# Patient Record
Sex: FEMALE | Race: WHITE | NOT HISPANIC OR LATINO | Employment: OTHER | URBAN - NONMETROPOLITAN AREA
[De-identification: names, ages, dates, MRNs, and addresses within clinical notes are randomized per-mention and may not be internally consistent; named-entity substitution may affect disease eponyms.]

---

## 2017-11-15 ENCOUNTER — APPOINTMENT (OUTPATIENT)
Dept: GENERAL RADIOLOGY | Facility: HOSPITAL | Age: 62
End: 2017-11-15
Attending: FAMILY MEDICINE
Payer: COMMERCIAL

## 2017-11-15 ENCOUNTER — HOSPITAL ENCOUNTER (EMERGENCY)
Facility: HOSPITAL | Age: 62
Discharge: HOME | End: 2017-11-15
Admitting: FAMILY MEDICINE
Payer: COMMERCIAL

## 2017-11-15 VITALS
RESPIRATION RATE: 20 BRPM | SYSTOLIC BLOOD PRESSURE: 113 MMHG | DIASTOLIC BLOOD PRESSURE: 59 MMHG | TEMPERATURE: 97.8 F | HEART RATE: 82 BPM | OXYGEN SATURATION: 93 %

## 2017-11-15 DIAGNOSIS — S33.5XXA LUMBAR SPRAIN, INITIAL ENCOUNTER: ICD-10-CM

## 2017-11-15 PROCEDURE — 99283 EMERGENCY DEPT VISIT LOW MDM: CPT | Performed by: FAMILY MEDICINE

## 2017-11-15 PROCEDURE — 72100 X-RAY EXAM L-S SPINE 2/3 VWS: CPT | Mod: TC

## 2017-11-15 PROCEDURE — 99283 EMERGENCY DEPT VISIT LOW MDM: CPT

## 2017-11-15 RX ORDER — NAPROXEN 500 MG/1
500 TABLET ORAL 2 TIMES DAILY WITH MEALS
Qty: 16 TABLET | Refills: 0 | Status: SHIPPED | OUTPATIENT
Start: 2017-11-15 | End: 2017-11-21

## 2017-11-15 RX ORDER — CYCLOBENZAPRINE HCL 5 MG
5 TABLET ORAL 3 TIMES DAILY PRN
Qty: 15 TABLET | Refills: 0 | Status: SHIPPED | OUTPATIENT
Start: 2017-11-15 | End: 2017-11-21

## 2017-11-15 ASSESSMENT — ENCOUNTER SYMPTOMS
CONSTITUTIONAL NEGATIVE: 1
NECK STIFFNESS: 0
RESPIRATORY NEGATIVE: 1
EYES NEGATIVE: 1
ENDOCRINE NEGATIVE: 1
JOINT SWELLING: 0
ARTHRALGIAS: 0
CARDIOVASCULAR NEGATIVE: 1
NEUROLOGICAL NEGATIVE: 1
BACK PAIN: 1
NECK PAIN: 0
MYALGIAS: 0
GASTROINTESTINAL NEGATIVE: 1

## 2017-11-15 NOTE — ED AVS SNAPSHOT
[facility]    [address] [address]    Phone:  [phone]                                       Corinne S Eden   MRN: [id number]    Department:  [facility]   Date of Visit:  11/15/2017           After Visit Summary Signature Page     I have received my discharge instructions, and my questions have been answered. I have discussed any challenges I see with this plan with the nurse or doctor.    ..........................................................................................................................................  Patient/Patient Representative Signature      ..........................................................................................................................................  Patient Representative Print Name and Relationship to Patient    ..................................................               ................................................  Date                                            Time    ..........................................................................................................................................  Reviewed by Signature/Title    ...................................................              ..............................................  Date                                                            Time

## 2017-11-15 NOTE — ED NOTES
Face to face report given with opportunity to observe patient.    Report given to brad Mcallister   11/15/2017  7:06 AM

## 2017-11-15 NOTE — ED AVS SNAPSHOT
[facility]    [address]    [place] [address]    Phone:  [phone]                                       Corinne S Eden   MRN: [id number]    Department:  [facility]   Date of Visit:  11/15/2017           Patient Information     Date Of Birth          1955        Your diagnoses for this visit were:     Lumbar sprain, initial encounter       Follow-up Information     Schedule an appointment as soon as possible for a visit with No Ref-Primary, Physician.    Why:  If symptoms worsen or not improving within the next week     Contact information:    NO REF-PRIMARY PHYSICIAN          Discharge Instructions         Causes of Lumbar (Low Back) Pain  Low back pain can be caused by problems with any part of the lumbar spine. A disk can herniate (push out) and press on a nerve. Vertebrae can rub against each other or slip out of place. This can irritate facet joints and nerves. It can also lead to stenosis, a narrowing of the spinal canal or foramen.  Pressure from a disk  Constant wear and tear on a disk can cause it to weaken and push outward. Part of the disk may then press on nearby nerves. There are two common types of herniated disks:  Contained means the soft nucleus is protruding outward.   Extruded means the firm annulus has torn, letting the soft center squeeze through.     Pressure from bone  An unstable spine   With age, a disk may thin and wear out. Vertebrae above and below the disk may begin to touch. This can put pressure on nerves. It can also cause bone spurs (growths) to form where the bones rub together.    Stenosis results when bone spurs narrow the foramen or spinal canal. This also puts pressure on nerves. Slipping vertebrae can irritate nerves and joints. They can also worsen stenosis.    In some cases, vertebrae become unstable and slip forward. This is called spondylolisthesis.     Date Last Reviewed: 10/12/2015    0148-2277 [employer]  [address] [place] [address]. All rights reserved. This information is not intended as a substitute for professional medical care. Always follow your healthcare professional's instructions.             Review of your medicines      START taking        Dose / Directions Last dose taken    cyclobenzaprine 5 MG tablet   Commonly known as:  FLEXERIL   Dose:  5 mg   Quantity:  15 tablet        Take 1 tablet (5 mg) by mouth 3 times daily as needed for muscle spasms   Refills:  0        naproxen 500 MG tablet   Commonly known as:  NAPROSYN   Dose:  500 mg   Quantity:  16 tablet        Take 1 tablet (500 mg) by mouth 2 times daily (with meals) for 8 days   Refills:  0          Our records show that you are taking the medicines listed below. If these are incorrect, please call your family doctor or clinic.        Dose / Directions Last dose taken    calcium-vitamin D 600-400 MG-UNIT per tablet   Commonly known as:  CALTRATE   Dose:  1 tablet        Take 1 tablet by mouth 2 times daily   Refills:  0        dexamethasone 4 MG tablet   Commonly known as:  DECADRON   Dose:  4 mg   Quantity:  10 tablet        Take 1 tablet (4 mg) by mouth 2 times daily (with meals) for 5 days   Refills:  0        vitamin B complex with vitamin C Tabs tablet   Dose:  1 tablet        Take 1 tablet by mouth daily   Refills:  0                Prescriptions were sent or printed at these locations (2 Prescriptions)                   [facility] [address] [place] [address] [facility] [place] [facility] [address] [facility] [address]   [address] [place] [address]    Telephone:  [phone]   Fax:  [phone]   Hours:                  E-Prescribed (2 of 2)         cyclobenzaprine (FLEXERIL) 5 MG tablet               naproxen (NAPROSYN) 500 MG tablet                Procedures and tests performed during your visit     Lumbar spine XR, 2-3 views      Orders Needing Specimen Collection     None      Pending Results     Date and Time Order Name Status  "Description    11/15/2017 0705 Lumbar spine XR, 2-3 views In process             Pending Culture Results     No orders found from 2017 to 2017.            Thank you for choosing [facility]       Thank you for choosing [facility] for your care. Our goal is always to provide you with excellent care. Hearing back from our patients is one way we can continue to improve our services. Please take a few minutes to complete the written survey that you may receive in the mail after you visit with us. Thank you!        [employer] Information     [employer] lets you send messages to your doctor, view your test results, renew your prescriptions, schedule appointments and more. To sign up, go to [email or web address] . Click on \"Log in\" on the left side of the screen, which will take you to the Welcome page. Then click on \"Sign up Now\" on the right side of the page.     You will be asked to enter the access code listed below, as well as some personal information. Please follow the directions to create your username and password.     Your access code is: [email or web address]  Expires: 2018  7:39 AM     Your access code will  in 90 days. If you need help or a new code, please call your [facility] clinic or [phone].        Care EveryWhere ID     This is your Care EveryWhere ID. This could be used by other organizations to access your [facility] medical records  [id number]        Equal Access to Services     ABEL RUSSELL : Hadjennifer Odell, waaxda jessica, qaybta beaualmatiana hernandez . So [facility] [place] [facility] [phone].    ATENCIÓN: Si habla español, tiene a oquendo disposición servicios gratuitos de asistencia lingüística. Llame al [phone].    We comply with applicable federal civil rights laws and [place] laws. We do not discriminate on the basis of race, color, national origin, age, disability, sex, sexual orientation, or gender identity.            After Visit Summary     "   This is your record. Keep this with you and show to your community pharmacist(s) and doctor(s) at your next visit.

## 2017-11-15 NOTE — ED NOTES
Discharged to home with son.  Prescriptions were e-scribed to [facility].  Pt c/o pain with blood pressure cuff during BP reading.  Switched cuffs and arm, second reading was not painful.

## 2017-11-15 NOTE — DISCHARGE INSTRUCTIONS
Causes of Lumbar (Low Back) Pain  Low back pain can be caused by problems with any part of the lumbar spine. A disk can herniate (push out) and press on a nerve. Vertebrae can rub against each other or slip out of place. This can irritate facet joints and nerves. It can also lead to stenosis, a narrowing of the spinal canal or foramen.  Pressure from a disk  Constant wear and tear on a disk can cause it to weaken and push outward. Part of the disk may then press on nearby nerves. There are two common types of herniated disks:  Contained means the soft nucleus is protruding outward.   Extruded means the firm annulus has torn, letting the soft center squeeze through.     Pressure from bone  An unstable spine   With age, a disk may thin and wear out. Vertebrae above and below the disk may begin to touch. This can put pressure on nerves. It can also cause bone spurs (growths) to form where the bones rub together.    Stenosis results when bone spurs narrow the foramen or spinal canal. This also puts pressure on nerves. Slipping vertebrae can irritate nerves and joints. They can also worsen stenosis.    In some cases, vertebrae become unstable and slip forward. This is called spondylolisthesis.     Date Last Reviewed: 10/12/2015    5960-3651 [employer]. [address] [place] [address], [place] [address]. All rights reserved. This information is not intended as a substitute for professional medical care. Always follow your healthcare professional's instructions.

## 2017-11-15 NOTE — ED NOTES
"Pt ambulatory to room 1 of the ED with son. Pt reports that she started having left lower back pain about 2 days ago, no injury noted, 5/10 pain. CMS intact. Pt reports it hurts worse if she is sits for awhile, but gets better if shes up and moving around. Pt does not have a PCP because she doesn't \"need one.\" Call light in reach.  "

## 2017-11-15 NOTE — ED PROVIDER NOTES
History     Chief Complaint   Patient presents with     Back Pain     HPI  Corinne S Eden is a 62 year old female who presents to the ER with back pain started 2 days ago . Worsening . Localized to left lower spine . No radiation . Rates 5/10. Does not describe pain as burning. Sharp pain with movement otherwise more of an ache.  NO bowel or bladder dysfunction . NO numbness or weakness of lower extremities.  Remainder of ros negative .     Problem List:    There are no active problems to display for this patient.       Past Medical History:    History reviewed. No pertinent past medical history.    Past Surgical History:    History reviewed. No pertinent surgical history.    Family History:    No family history on file.    Social History:  Marital Status:  Single [1]  Social History   Substance Use Topics     Smoking status: Current Every Day Smoker     Packs/day: 1.00     Years: 30.00     Smokeless tobacco: Never Used     Alcohol use Yes      Comment: rarely        Medications:      calcium-vitamin D (CALTRATE) 600-400 MG-UNIT per tablet   vitamin  B complex with vitamin C (VITAMIN  B COMPLEX) TABS   dexamethasone (DECADRON) 4 MG tablet         Review of Systems   Constitutional: Negative.    HENT: Negative.    Eyes: Negative.    Respiratory: Negative.    Cardiovascular: Negative.    Gastrointestinal: Negative.    Endocrine: Negative.    Genitourinary: Negative.    Musculoskeletal: Positive for back pain. Negative for arthralgias, gait problem, joint swelling, myalgias, neck pain and neck stiffness.   Neurological: Negative.    All other systems reviewed and are negative.      Physical Exam   BP: 122/73  Pulse: 100  Temp: 97.1  F (36.2  C)  Resp: 20  SpO2: 94 %      Physical Exam   Constitutional: She is oriented to person, place, and time. She appears well-developed and well-nourished. No distress.   HENT:   Head: Normocephalic and atraumatic.   Right Ear: External ear normal.   Left Ear: External ear normal.    Nose: Nose normal.   Mouth/Throat: Oropharynx is clear and moist. No oropharyngeal exudate.   Eyes: Pupils are equal, round, and reactive to light. Right eye exhibits no discharge. Left eye exhibits no discharge.   Neck: Normal range of motion.   Cardiovascular: Normal rate and regular rhythm.    Pulmonary/Chest: Effort normal and breath sounds normal.   Abdominal: Soft.   Musculoskeletal:   Tenderness lower LS spine to palpation . Left paralumbar spine spasm and tenderness    Neurological: She is alert and oriented to person, place, and time. She displays normal reflexes. No cranial nerve deficit. She exhibits normal muscle tone. Coordination normal.   Skin: Skin is warm and dry. She is not diaphoretic.   Psychiatric: She has a normal mood and affect.   Nursing note and vitals reviewed.      ED Course     ED Course     Procedures        Patient arrived to ED ambulatory . Patient triaged to exam room . History and exam completed. Xray completed . Degenerative changes lower lumbar spine .  NO acute findings. Discussed findings with patient . Patient discharged with instructions for muscle relaxers, ice, NSAIDs, avoidance of heavy lifting        Labs Ordered and Resulted from Time of ED Arrival Up to the Time of Departure from the ED - No data to display    Assessments & Plan (with Medical Decision Making)     I have reviewed the nursing notes.    I have reviewed the findings, diagnosis, plan and need for follow up with the patient.  Acute lumbar strain , NSAIDS, ice, flexeril as needed . Follow up if symptoms do not improve as anticipated     New Prescriptions    No medications on file       Acute lumbar strain,     11/15/2017   [facility]     Angie Schroeder MD  11/15/17 1806

## 2017-11-16 NOTE — PROGRESS NOTES
Lumbar Spine XR - IMPRESSION: Mild compression deformity of L4, age is uncertain.  Degenerative changes as described above.  Reviewed by SELVIN Wade, advised to follow up in one week if not improving and make appt to establish care with PCP.  TC to patient, notified of XR results and recommended follow up.  No further questions or concerns at this time

## 2017-11-21 ENCOUNTER — OFFICE VISIT (OUTPATIENT)
Dept: FAMILY MEDICINE | Facility: OTHER | Age: 62
End: 2017-11-21
Attending: FAMILY MEDICINE
Payer: COMMERCIAL

## 2017-11-21 VITALS
TEMPERATURE: 97.5 F | WEIGHT: 210 LBS | HEIGHT: 65 IN | BODY MASS INDEX: 34.99 KG/M2 | HEART RATE: 100 BPM | OXYGEN SATURATION: 95 % | SYSTOLIC BLOOD PRESSURE: 128 MMHG | DIASTOLIC BLOOD PRESSURE: 70 MMHG | RESPIRATION RATE: 24 BRPM

## 2017-11-21 DIAGNOSIS — S39.012A STRAIN OF LUMBAR REGION, INITIAL ENCOUNTER: ICD-10-CM

## 2017-11-21 DIAGNOSIS — M80.88XD OSTEOPOROTIC COMPRESSION FRACTURE OF SPINE, WITH ROUTINE HEALING, SUBSEQUENT ENCOUNTER: Primary | ICD-10-CM

## 2017-11-21 PROCEDURE — 99213 OFFICE O/P EST LOW 20 MIN: CPT | Performed by: FAMILY MEDICINE

## 2017-11-21 RX ORDER — CYCLOBENZAPRINE HCL 5 MG
5 TABLET ORAL 3 TIMES DAILY PRN
Qty: 40 TABLET | Refills: 0 | Status: SHIPPED | OUTPATIENT
Start: 2017-11-21 | End: 2018-05-15

## 2017-11-21 RX ORDER — NAPROXEN 500 MG/1
500 TABLET ORAL 2 TIMES DAILY WITH MEALS
Qty: 60 TABLET | Refills: 1 | Status: SHIPPED | OUTPATIENT
Start: 2017-11-21 | End: 2020-07-24

## 2017-11-21 ASSESSMENT — ANXIETY QUESTIONNAIRES
IF YOU CHECKED OFF ANY PROBLEMS ON THIS QUESTIONNAIRE, HOW DIFFICULT HAVE THESE PROBLEMS MADE IT FOR YOU TO DO YOUR WORK, TAKE CARE OF THINGS AT HOME, OR GET ALONG WITH OTHER PEOPLE: SOMEWHAT DIFFICULT
5. BEING SO RESTLESS THAT IT IS HARD TO SIT STILL: NOT AT ALL
6. BECOMING EASILY ANNOYED OR IRRITABLE: NOT AT ALL
7. FEELING AFRAID AS IF SOMETHING AWFUL MIGHT HAPPEN: NOT AT ALL
1. FEELING NERVOUS, ANXIOUS, OR ON EDGE: NOT AT ALL
2. NOT BEING ABLE TO STOP OR CONTROL WORRYING: NOT AT ALL
3. WORRYING TOO MUCH ABOUT DIFFERENT THINGS: NOT AT ALL
4. TROUBLE RELAXING: SEVERAL DAYS
GAD7 TOTAL SCORE: 1

## 2017-11-21 ASSESSMENT — PAIN SCALES - GENERAL: PAINLEVEL: MILD PAIN (2)

## 2017-11-21 ASSESSMENT — PATIENT HEALTH QUESTIONNAIRE - PHQ9: SUM OF ALL RESPONSES TO PHQ QUESTIONS 1-9: 2

## 2017-11-21 NOTE — NURSING NOTE
"Chief Complaint   Patient presents with     Back Pain     Follow up ER for left lower back pain - denies injury - onset 2 weeks       Initial /70  Pulse 100  Temp 97.5  F (36.4  C) (Tympanic)  Resp 24  Ht 5' 5\" (1.651 m)  Wt 210 lb (95.3 kg)  SpO2 95%  BMI 34.95 kg/m2 Estimated body mass index is 34.95 kg/(m^2) as calculated from the following:    Height as of this encounter: 5' 5\" (1.651 m).    Weight as of this encounter: 210 lb (95.3 kg).  Medication Reconciliation: complete   Aaliyah Guillermo LPN      "

## 2017-11-21 NOTE — MR AVS SNAPSHOT
"              After Visit Summary   11/21/2017    Corinne S Eden    MRN: [id number]           Patient Information     Date Of Birth          1955        Visit Information        Provider Department      11/21/2017 8:45 AM Yanna Epperson MD [facility]        Today's Diagnoses     Osteoporotic compression fracture of spine, with routine healing, subsequent encounter    -  1    Strain of lumbar region, initial encounter           Follow-ups after your visit        Additional Services     PHYSICAL THERAPY REFERRAL       *This therapy referral will be filtered to a centralized scheduling office at [facility] and the patient will receive a call to schedule an appointment at a [facility] location most convenient for them. *     [facility] provides Physical Therapy evaluation and treatment and many specialty services across the [facility] system.  If requesting a specialty program, please choose from the list below.    If you have not heard from the scheduling office within 2 business days, please call [phone] for all locations, with the exception of [place], please call [phone].  Treatment: Evaluation & Treatment  Special Instructions/Modalities: none   Special Programs:     Please be aware that coverage of these services is subject to the terms and limitations of your health insurance plan.  Call member services at your health plan with any benefit or coverage questions.      **Note to Provider:  If you are referring outside of [facility] for the therapy appointment, please list the name of the location in the \"special instructions\" above, print the referral and give to the patient to schedule the appointment.                  Your next 10 appointments already scheduled     Nov 28, 2017  8:30 AM CST   LBP LOW INITIAL with Carlie Dunaway, PT   [facility] ([facility] )    [address] [place] [address]   [phone]            " "Dec 19, 2017  9:00 AM CST   (Arrive by 8:45 AM)   Office Visit with Yanna Epperson MD   [facility] ([facility] )    [place] [address]   [phone]           Bring a current list of meds and any records pertaining to this visit.  For Physicals, please bring immunization records and any forms needing to be filled out.  Please arrive 15 minutes early to complete paperwork and register.              Who to contact     If you have questions or need follow up information about today's clinic visit or your schedule please contact [facility] directly at [phone].  Normal or non-critical lab and imaging results will be communicated to you by MyChart, letter or phone within 4 business days after the clinic has received the results. If you do not hear from us within 7 days, please contact the clinic through [employer] or phone. If you have a critical or abnormal lab result, we will notify you by phone as soon as possible.  Submit refill requests through [employer] or call your pharmacy and they will forward the refill request to us. Please allow 3 business days for your refill to be completed.          Additional Information About Your Visit        MyChart Information     [employer] lets you send messages to your doctor, view your test results, renew your prescriptions, schedule appointments and more. To sign up, go to [email or web address] . Click on \"Log in\" on the left side of the screen, which will take you to the Welcome page. Then click on \"Sign up Now\" on the right side of the page.     You will be asked to enter the access code listed below, as well as some personal information. Please follow the directions to create your username and password.     Your access code is: [email or web address]  Expires: 2018  7:39 AM     Your access code will  in 90 days. If you need help or a new code, please call your [facility] or [phone].        Care " "EveryWhere ID     This is your Care EveryWhere ID. This could be used by other organizations to access your [facility] medical records  [id number]        Your Vitals Were     Pulse Temperature Respirations Height Pulse Oximetry BMI (Body Mass Index)    100 97.5  F (36.4  C) (Tympanic) 24 5' 5\" (1.651 m) 95% 34.95 kg/m2       Blood Pressure from Last 3 Encounters:   11/21/17 128/70   11/15/17 113/59   04/09/15 139/86    Weight from Last 3 Encounters:   11/21/17 210 lb (95.3 kg)              We Performed the Following     PHYSICAL THERAPY REFERRAL          Where to get your medicines      These medications were sent to [id number] - [place], [place] - [address] AT [facility] [address], [place] [address]     Phone:  [phone]     cyclobenzaprine 5 MG tablet    naproxen 500 MG tablet          Primary Care Provider    Physician No Ref-Primary       NO REF-PRIMARY PHYSICIAN        Equal Access to Services     CHASE [facility] Center[facility]A AH: Hadii aad ku hadasho Soomaali, waaxda luqadaha, qaybta kaalmada adeegyada, waxay mayain hayjoselo dominguez . So [facility] [phone].    ATENCIÓN: Si habla español, tiene a oquendo disposición servicios gratuitos de asistencia lingüística. Llame al [phone].    We comply with applicable federal civil rights laws and [place] laws. We do not discriminate on the basis of race, color, national origin, age, disability, sex, sexual orientation, or gender identity.            Thank you!     Thank you for choosing [facility]  for your care. Our goal is always to provide you with excellent care. Hearing back from our patients is one way we can continue to improve our services. Please take a few minutes to complete the written survey that you may receive in the mail after your visit with us. Thank you!             Your Updated Medication List - Protect others around you: Learn how to safely use, store and throw away your medicines at " www.disposemymeds.org.          This list is accurate as of: 11/21/17  9:27 AM.  Always use your most recent med list.                   Brand Name Dispense Instructions for use Diagnosis    calcium-vitamin D 600-400 MG-UNIT per tablet    CALTRATE     Take 1 tablet by mouth 2 times daily        cyclobenzaprine 5 MG tablet    FLEXERIL    40 tablet    Take 1 tablet (5 mg) by mouth 3 times daily as needed for muscle spasms    Osteoporotic compression fracture of spine, with routine healing, subsequent encounter       naproxen 500 MG tablet    NAPROSYN    60 tablet    Take 1 tablet (500 mg) by mouth 2 times daily (with meals)    Osteoporotic compression fracture of spine, with routine healing, subsequent encounter       vitamin B complex with vitamin C Tabs tablet      Take 1 tablet by mouth daily

## 2017-11-22 ASSESSMENT — ANXIETY QUESTIONNAIRES: GAD7 TOTAL SCORE: 1

## 2017-11-22 NOTE — PROGRESS NOTES
[facility]    Corinne S Eden, 62 year old, female presents with   Chief Complaint   Patient presents with     Back Pain     Follow up ER for left lower back pain - denies injury - onset 2 weeks. She developed left sided low back pain about 3 weeks ago without history of trauma. She was seen in the ER, x rays revealed a mild compression fracture of L4. She has been taking Naproxne and Flexeril with relief but the pain continues. She doesn't have PCP and is accompaned by a friend Beverly today. No change is bowel or bladder habits. She works at [employer] and sits for long periods of time.        PAST MEDICAL HISTORY:  History reviewed. No pertinent past medical history.    PAST SURGICAL HISTORY:  History reviewed. No pertinent surgical history.    SOCIAL HISTORY  Social History     Social History     Marital status: Single     Spouse name: N/A     Number of children: N/A     Years of education: N/A     Occupational History     Not on file.     Social History Main Topics     Smoking status: Current Every Day Smoker     Packs/day: 1.00     Years: 30.00     Smokeless tobacco: Never Used     Alcohol use Yes      Comment: rarely     Drug use: No     Sexual activity: Not on file     Other Topics Concern     Parent/Sibling W/ Cabg, Mi Or Angioplasty Before 65f 55m? No     Social History Narrative       MEDICATIONS:  Prior to Admission medications    Medication Sig Start Date End Date Taking? Authorizing Provider   cyclobenzaprine (FLEXERIL) 5 MG tablet Take 1 tablet (5 mg) by mouth 3 times daily as needed for muscle spasms 11/21/17  Yes Yanna Epperson MD   naproxen (NAPROSYN) 500 MG tablet Take 1 tablet (500 mg) by mouth 2 times daily (with meals) 11/21/17  Yes Yanna Epperson MD   calcium-vitamin D (CALTRATE) 600-400 MG-UNIT per tablet Take 1 tablet by mouth 2 times daily    Reported, Patient   vitamin  B complex with vitamin C (VITAMIN  B COMPLEX) TABS Take 1 tablet by mouth daily    Reported, Patient  "      ALLERGIES:     Allergies   Allergen Reactions     Penicillins        ROS:  CONSTITUTIONAL:NEGATIVE for fever, chills, change in weight  INTEGUMENTARY/SKIN: NEGATIVE for worrisome rashes, moles or lesions  NEURO: NEGATIVE for weakness, dizziness or paresthesias  PSYCHIATRIC: NEGATIVE for changes in mood or affect    EXAM:  /70  Pulse 100  Temp 97.5  F (36.4  C) (Tympanic)  Resp 24  Ht 5' 5\" (1.651 m)  Wt 210 lb (95.3 kg)  SpO2 95%  BMI 34.95 kg/m2 Body mass index is 34.95 kg/(m^2).   GENERAL APPEARANCE: healthy, alert and no distress  MS: extremities normal- no gross deformities noted and tenderness of the left lower lumbar Paraspinous muscles and the spine at the L4 area. Full ROM, straight leg raising is negative  SKIN: no suspicious lesions or rashes  NEURO: Normal strength and tone, mentation intact and speech normal  PSYCH: mentation appears normal and affect normal/bright    LABS AND IMAGING:     Results for orders placed or performed during the hospital encounter of 11/15/17   Lumbar spine XR, 2-3 views    Narrative    XR LUMBAR SPINE 2-3 VIEWS    HISTORY: 62 yearsFemale back pain;     TECHNIQUE: 3 views lumbar spine    COMPARISON: None    FINDINGS: Alignment is normal. There is mild loss of L4 vertebral  height. There is superior endplate can cavity. There is no evidence of  subluxation or fracture otherwise. There is facet osteoarthritis of  the lower lumbar spine. There is mild degenerative disc space  narrowing at L304. There is mild-to-moderate disc space narrowing at  L1-L2.      Impression    IMPRESSION: Mild compression deformity of L4, age is uncertain.    Degenerative changes as described above.    BERTO IRWIN MD         ASSESSMENT/PLAN:  (M80.88XD) Osteoporotic compression fracture of spine, with routine healing, subsequent encounter  (primary encounter diagnosis)  Comment: L4  Plan: cyclobenzaprine (FLEXERIL) 5 MG tablet,         naproxen (NAPROSYN) 500 MG tablet        " Renewed for her. We discussed that she needs to have a Dexa scan and start calcium 1200 mg daily and 2000 units of vitamin D daily. Will see her back to establish care     (S39.012A) Strain of lumbar region, initial encounter  Comment:   Plan: PHYSICAL THERAPY REFERRAL        Will refer for therapy      Yanna Epperson MD  November 22, 2017

## 2017-11-28 ENCOUNTER — HOSPITAL ENCOUNTER (OUTPATIENT)
Dept: PHYSICAL THERAPY | Facility: HOSPITAL | Age: 62
Setting detail: THERAPIES SERIES
End: 2017-11-28
Attending: FAMILY MEDICINE
Payer: COMMERCIAL

## 2017-11-28 PROCEDURE — 97161 PT EVAL LOW COMPLEX 20 MIN: CPT | Mod: GP

## 2017-11-28 PROCEDURE — 97110 THERAPEUTIC EXERCISES: CPT | Mod: GP

## 2017-11-28 PROCEDURE — 40000718 ZZHC STATISTIC PT DEPARTMENT ORTHO VISIT

## 2017-11-29 NOTE — PROGRESS NOTES
11/28/17 0800   General Information   Type of Visit Initial OP Ortho PT Evaluation   Start of Care Date 11/28/17   Referring Physician Dr. Epperson   Orders Evaluate and Treat   Orders Comment Low back strain   Insurance Type ([employer])   Medical Diagnosis Mild compression fracture L 4   Surgical/Medical history reviewed Yes   Precautions/Limitations no known precautions/limitations   Body Part(s)   Body Part(s) Lumbar Spine/SI   Presentation and Etiology   Pertinent history of current problem (include personal factors and/or comorbidities that impact the POC) Reports she isn't sure how she got the compression fracture. Has a history of ankle fractures. Reports she was dancing at a wedding and wonders if that is when it happened. Reports pain has improved a lot and it isn't sharp anymore, just a dull ache. Works for [employer] and is back to work.  Reports she would like a home exercise program   Impairments A. Pain;D. Decreased ROM;E. Decreased flexibility   Functional Limitations perform activities of daily living   Symptom Location Lower back   How/Where did it occur From insidious onset   Onset date of current episode/exacerbation 11/21/17   Chronicity New   Pain rating (0-10 point scale) Best (/10);Worst (/10)   Best (/10) 0   Worst (/10) 1   Pain quality C. Aching;B. Dull   Frequency of pain/symptoms C. With activity   Pain/symptoms exacerbated by A. Sitting;C. Lifting;I. Bending;K. Home tasks   Pain/symptoms eased by B. Walking;E. Changing positions;H. Cold   Progression of symptoms since onset: Improved   Current Level of Function   Patient role/employment history A. Employed   Employment Comments Works at [employer]   Fall Risk Screen   Fall screen completed by PT   Have you fallen 2 or more times in the past year? No   Have you fallen and had an injury in the past year? No   Is patient a fall risk? No   Lumbar Spine/SI Objective Findings   Observation No acute distress   Integumentary No issues   Posture Good     Gait/Locomotion Normal   Flexion ROM Full   Extension ROM Full   Right Side Bending ROM Full   Left Side Bending ROM Full   Pelvic Screen Pelvis level. No leg length issues   Hip Screen Good   Transversus Abdominus Strength (Monty Leg Lowering-deg) Weak   Hip Flexion (L2) Strength 5/5   Hip Abduction Strength 5/5   Hip Adduction Strength 5/5   Hip Extension Strength 4-/5   Knee Flexion Strength 5/5   Knee Extension (L3) Strength 5/5   Ankle Dorsiflexion (L4) Strength 5/5   Great Toe Extension (L5) Strength 5/5   Hamstring Flexibility Good   Hip Flexor Flexibility Good   Quadricep Flexibility Good   Palpation Mildy tender bilateral paraspinals around L 4   Planned Therapy Interventions   Planned Therapy Interventions strengthening;stretching;manual therapy;neuromuscular re-education   Planned Modality Interventions   Planned Modality Interventions Ultrasound;Hot packs   Clinical Impression   Criteria for Skilled Therapeutic Interventions Met yes, treatment indicated   PT Diagnosis Low back pain, increased tissue tension, weakness   Influenced by the following impairments Low back pain, increased tissue tension, weakness   Functional limitations due to impairments Decreased tolerance for daily tasks, work tasks due to pain   Clinical Presentation Stable/Uncomplicated   Clinical Presentation Rationale Currently stable   Clinical Decision Making (Complexity) Low complexity   Therapy Frequency (PRN)   Predicted Duration of Therapy Intervention (days/wks) up to 8 wks   Risk & Benefits of therapy have been explained Yes   Patient, Family & other staff in agreement with plan of care Yes   Clinical Impression Comments Pt demonstrates c low back pain, increased tissue tension s/p compression fracture. Also demonstrates with some glute and core weakness. Pt just interested in HEP for now. Will follow up PRN   ORTHO GOALS   PT Ortho Eval Goals 1;2;3   Ortho Goal 1   Goal Identifier STG 1   Goal Description Pt will  demonstrate knowledge/understanding of HEP and report daily compliance   Target Date 12/12/17   Ortho Goal 2   Goal Identifier LTG 1   Goal Description Pt will complete all daily actitivity, work activities without being limited by back pain   Target Date 01/23/18   Total Evaluation Time   Total Evaluation Time 15

## 2017-12-19 ENCOUNTER — OFFICE VISIT (OUTPATIENT)
Dept: FAMILY MEDICINE | Facility: OTHER | Age: 62
End: 2017-12-19
Attending: FAMILY MEDICINE
Payer: COMMERCIAL

## 2017-12-19 VITALS
WEIGHT: 210 LBS | SYSTOLIC BLOOD PRESSURE: 126 MMHG | OXYGEN SATURATION: 96 % | DIASTOLIC BLOOD PRESSURE: 74 MMHG | BODY MASS INDEX: 34.99 KG/M2 | HEIGHT: 65 IN | RESPIRATION RATE: 20 BRPM | HEART RATE: 90 BPM

## 2017-12-19 DIAGNOSIS — Z12.31 ENCOUNTER FOR SCREENING MAMMOGRAM FOR BREAST CANCER: ICD-10-CM

## 2017-12-19 DIAGNOSIS — Z71.6 TOBACCO ABUSE COUNSELING: ICD-10-CM

## 2017-12-19 DIAGNOSIS — S32.040D CLOSED COMPRESSION FRACTURE OF L4 LUMBAR VERTEBRA WITH ROUTINE HEALING, SUBSEQUENT ENCOUNTER: ICD-10-CM

## 2017-12-19 DIAGNOSIS — E28.39 ESTROGEN DEFICIENCY: ICD-10-CM

## 2017-12-19 DIAGNOSIS — Z00.00 HEALTH CARE MAINTENANCE: ICD-10-CM

## 2017-12-19 DIAGNOSIS — Z76.89 ENCOUNTER TO ESTABLISH CARE: Primary | ICD-10-CM

## 2017-12-19 PROCEDURE — 99214 OFFICE O/P EST MOD 30 MIN: CPT | Performed by: FAMILY MEDICINE

## 2017-12-19 ASSESSMENT — ANXIETY QUESTIONNAIRES
2. NOT BEING ABLE TO STOP OR CONTROL WORRYING: NOT AT ALL
5. BEING SO RESTLESS THAT IT IS HARD TO SIT STILL: NOT AT ALL
6. BECOMING EASILY ANNOYED OR IRRITABLE: NOT AT ALL
7. FEELING AFRAID AS IF SOMETHING AWFUL MIGHT HAPPEN: NOT AT ALL
4. TROUBLE RELAXING: SEVERAL DAYS
1. FEELING NERVOUS, ANXIOUS, OR ON EDGE: NOT AT ALL
3. WORRYING TOO MUCH ABOUT DIFFERENT THINGS: NOT AT ALL
IF YOU CHECKED OFF ANY PROBLEMS ON THIS QUESTIONNAIRE, HOW DIFFICULT HAVE THESE PROBLEMS MADE IT FOR YOU TO DO YOUR WORK, TAKE CARE OF THINGS AT HOME, OR GET ALONG WITH OTHER PEOPLE: NOT DIFFICULT AT ALL
GAD7 TOTAL SCORE: 1

## 2017-12-19 ASSESSMENT — PATIENT HEALTH QUESTIONNAIRE - PHQ9: SUM OF ALL RESPONSES TO PHQ QUESTIONS 1-9: 4

## 2017-12-19 ASSESSMENT — PAIN SCALES - GENERAL: PAINLEVEL: NO PAIN (0)

## 2017-12-19 NOTE — MR AVS SNAPSHOT
After Visit Summary   12/19/2017    Corinne S Eden    MRN: [id number]           Patient Information     Date Of Birth          1955        Visit Information        Provider Department      12/19/2017 9:00 AM Yanna Epperson MD [facility]        Today's Diagnoses     Encounter to establish care    -  1    Closed compression fracture of L4 lumbar vertebra with routine healing, subsequent encounter        Encounter for screening mammogram for breast cancer        Estrogen deficiency        Tobacco abuse counseling        Health care maintenance           Follow-ups after your visit        Your next 10 appointments already scheduled     Dec 26, 2017  9:45 AM CST   LAB with HC LAB   [facility] ([facility] )    [address] [address]   [phone]            Dec 26, 2017  1:00 PM CST   DX HIP/PELVIS/SPINE with HIDX1   [facility] DEXA ([facility] )    [address]  [facility] [id number] [phone]           Please do not take any of the following 24 hours prior to the day of your exam: vitamins, calcium tablets, antacids.  If possible, please wear clothes without metal (snaps, zippers). A sweatsuit works well.            Dec 26, 2017  1:30 PM CST   (Arrive by 1:15 PM)   MA SCREENING DIGITAL BILATERAL with HCMA1   [facility] ([facility] )    [address] [address]   [phone]           Do not use any powder, lotion or deodorant under your arms or on your breast. If you do, we will ask you to remove it before your exam.  Wear comfortable, two-piece clothing.  If you have any allergies, tell your care team.  Bring any previous mammograms from other facilities or have them mailed to the breast center. Three-dimensional (3D) mammograms are available at [place] locations in [facility], [facility], [facility], [place], [place], and [place]. [place]  "locations include [facility] in [place]. Benefits of 3D mammograms include: - Improved rate of cancer detection - Decreases your chance of having to go back for more tests, which means fewer: - \"False-positive\" results (This means that there is an abnormal area but it isn't cancer.) - Invasive testing procedures, such as a biopsy or surgery - Can provide clearer images of the breast if you have dense breast tissue. 3D mammography is an optional exam that anyone can have with a 2D mammogram. It doesn't replace or take the place of a 2D mammogram. 2D mammograms remain an effective screening test for all women.  Not all insurance companies cover the cost of a 3D mammogram. Check with your insurance.              Future tests that were ordered for you today     Open Standing Orders        Priority Remaining Interval Expires Ordered    Lipid Profile Routine 4/4 12/19/2018 12/19/2017          Open Future Orders        Priority Expected Expires Ordered    DX Hip/Pelvis/Spine Routine  12/19/2018 12/19/2017    MA Screening Digital Bilateral Routine  12/19/2018 12/19/2017            Who to contact     If you have questions or need follow up information about today's clinic visit or your schedule please contact [facility] directly at [phone].  Normal or non-critical lab and imaging results will be communicated to you by shopphart, letter or phone within 4 business days after the clinic has received the results. If you do not hear from us within 7 days, please contact the clinic through shopphart or phone. If you have a critical or abnormal lab result, we will notify you by phone as soon as possible.  Submit refill requests through Screen Tonic or call your pharmacy and they will forward the refill request to us. Please allow 3 business days for your refill to be completed.          Additional Information About Your Visit        Screen Tonic Information     Screen Tonic lets you send messages " "to your doctor, view your test results, renew your prescriptions, schedule appointments and more. To sign up, go to [email or web address] . Click on \"Log in\" on the left side of the screen, which will take you to the Welcome page. Then click on \"Sign up Now\" on the right side of the page.     You will be asked to enter the access code listed below, as well as some personal information. Please follow the directions to create your username and password.     Your access code is: [email or web address]  Expires: 2018  7:39 AM     Your access code will  in 90 days. If you need help or a new code, please call your [facility] clinic or [phone].        Care EveryWhere ID     This is your Care EveryWhere ID. This could be used by other organizations to access your [facility] medical records  [id number]        Your Vitals Were     Pulse Respirations Height Pulse Oximetry BMI (Body Mass Index)       90 20 5' 5\" (1.651 m) 96% 34.95 kg/m2        Blood Pressure from Last 3 Encounters:   17 126/74   17 128/70   11/15/17 113/59    Weight from Last 3 Encounters:   17 210 lb (95.3 kg)   17 210 lb (95.3 kg)               Primary Care Provider Fax #    Physician No Ref-Primary [phone]       No address on file        Equal Access to Services     ABEL RUSSELL : Hadii evelia ku hadasho Soomaali, waaxda luqadaha, qaybta kaalmada adeegyada, tiana dominguez . So [facility] [phone].    ATENCIÓN: Si habla español, tiene a oquendo disposición servicios gratuitos de asistencia lingüística. Llame al [phone].    We comply with applicable federal civil rights laws and [place] laws. We do not discriminate on the basis of race, color, national origin, age, disability, sex, sexual orientation, or gender identity.            Thank you!     Thank you for choosing [facility]  for your care. Our goal is always to provide you with excellent care. Hearing back from our patients is one " way we can continue to improve our services. Please take a few minutes to complete the written survey that you may receive in the mail after your visit with us. Thank you!             Your Updated Medication List - Protect others around you: Learn how to safely use, store and throw away your medicines at www.disposemymeds.org.          This list is accurate as of: 12/19/17  9:39 AM.  Always use your most recent med list.                   Brand Name Dispense Instructions for use Diagnosis    calcium-vitamin D 600-400 MG-UNIT per tablet    CALTRATE     Take 1 tablet by mouth 2 times daily        cyclobenzaprine 5 MG tablet    FLEXERIL    40 tablet    Take 1 tablet (5 mg) by mouth 3 times daily as needed for muscle spasms    Osteoporotic compression fracture of spine, with routine healing, subsequent encounter       naproxen 500 MG tablet    NAPROSYN    60 tablet    Take 1 tablet (500 mg) by mouth 2 times daily (with meals)    Osteoporotic compression fracture of spine, with routine healing, subsequent encounter

## 2017-12-19 NOTE — NURSING NOTE
"Chief Complaint   Patient presents with     Establish Care       Initial /74  Pulse 90  Resp 20  Ht 5' 5\" (1.651 m)  Wt 210 lb (95.3 kg)  SpO2 96%  BMI 34.95 kg/m2 Estimated body mass index is 34.95 kg/(m^2) as calculated from the following:    Height as of this encounter: 5' 5\" (1.651 m).    Weight as of this encounter: 210 lb (95.3 kg).  Medication Reconciliation: complete   Sharon Méndez      "

## 2017-12-19 NOTE — PROGRESS NOTES
[facility]    Corinne S Eden, 62 year old, female presents with   Chief Complaint   Patient presents with     Establish Care     she hasn't been seen in many years for preventative health care     compression fracture     L4. Pain is improving, she hasn't used her medications much. She continues to work at [employer] and uses a rolled blanket behind her back. She sleeps with pillows for support at night.        PAST MEDICAL HISTORY:  History reviewed. No pertinent past medical history.    PAST SURGICAL HISTORY:  Past Surgical History:   Procedure Laterality Date     ------------OTHER-------------      remove polyps from vocal cords      SECTION      son     FRACTURE TX, ANKLE RT/LT Right 2005    surgery x 2       SOCIAL HISTORY  Social History     Social History     Marital status: Single     Spouse name: N/A     Number of children: N/A     Years of education: N/A     Occupational History     Not on file.     Social History Main Topics     Smoking status: Current Every Day Smoker     Packs/day: 1.00     Years: 30.00     Smokeless tobacco: Never Used     Alcohol use Yes      Comment: rarely     Drug use: No     Sexual activity: Not on file     Other Topics Concern     Parent/Sibling W/ Cabg, Mi Or Angioplasty Before 65f 55m? No     Social History Narrative       MEDICATIONS:  Prior to Admission medications    Medication Sig Start Date End Date Taking? Authorizing Provider   cyclobenzaprine (FLEXERIL) 5 MG tablet Take 1 tablet (5 mg) by mouth 3 times daily as needed for muscle spasms 17  Yes Yanna Epperson MD   naproxen (NAPROSYN) 500 MG tablet Take 1 tablet (500 mg) by mouth 2 times daily (with meals) 17  Yes Yanna Epperson MD   calcium-vitamin D (CALTRATE) 600-400 MG-UNIT per tablet Take 1 tablet by mouth 2 times daily   Yes Reported, Patient       ALLERGIES:     Allergies   Allergen Reactions     Penicillins        ROS:  CONSTITUTIONAL:NEGATIVE for fever, chills, change in  "weight  INTEGUMENTARY/SKIN: NEGATIVE for worrisome rashes, moles or lesions  EYES: NEGATIVE for vision changes or irritation  ENT/MOUTH: NEGATIVE for ear, mouth and throat problems  RESP:NEGATIVE for significant cough or SOB  CV: NEGATIVE for chest pain, palpitations or peripheral edema  GI: NEGATIVE for nausea, abdominal pain, heartburn, or change in bowel habits  NEURO: NEGATIVE for weakness, dizziness or paresthesias  ENDOCRINE: NEGATIVE for temperature intolerance, skin/hair changes  HEME/ALLERGY/IMMUNE: NEGATIVE for bleeding problems  PSYCHIATRIC: NEGATIVE for changes in mood or affect    EXAM:  /74  Pulse 90  Resp 20  Ht 5' 5\" (1.651 m)  Wt 210 lb (95.3 kg)  SpO2 96%  BMI 34.95 kg/m2 Body mass index is 34.95 kg/(m^2).   GENERAL APPEARANCE: healthy, alert and no distress  EYES: Eyes grossly normal to inspection, PERRL and conjunctivae and sclerae normal  HENT: ear canals and TM's normal and nose and mouth without ulcers or lesions  NECK: no adenopathy, no asymmetry, masses, or scars and thyroid normal to palpation  RESP: lungs clear to auscultation - no rales, rhonchi or wheezes  CV: regular rates and rhythm, normal S1 S2, no S3 or S4, no murmur, click or rub and no bruits heard  LYMPHATICS: normal ant/post cervical and supraclavicular nodes  ABDOMEN: soft, nontender, without hepatosplenomegaly or masses and bowel sounds normal  MS: extremities normal- no gross deformities noted and slight tenderness of the right lumbar para spinous muscles. No tenderness of the lumbar spine  SKIN: no suspicious lesions or rashes  NEURO: Normal strength and tone, mentation intact and speech normal  PSYCH: mentation appears normal and affect normal/bright    LABS AND IMAGING:     Results for orders placed or performed during the hospital encounter of 11/15/17   Lumbar spine XR, 2-3 views    Narrative    XR LUMBAR SPINE 2-3 VIEWS    HISTORY: 62 yearsFemale back pain;     TECHNIQUE: 3 views lumbar spine    COMPARISON: " None    FINDINGS: Alignment is normal. There is mild loss of L4 vertebral  height. There is superior endplate can cavity. There is no evidence of  subluxation or fracture otherwise. There is facet osteoarthritis of  the lower lumbar spine. There is mild degenerative disc space  narrowing at L304. There is mild-to-moderate disc space narrowing at  L1-L2.      Impression    IMPRESSION: Mild compression deformity of L4, age is uncertain.    Degenerative changes as described above.    BERTO IRWIN MD         ASSESSMENT/PLAN:  (Z76.89) Encounter to establish care  (primary encounter diagnosis)  Comment:   Plan: colonoscopy, IFOB testing, pap smear, Hepatitis C testing all declined today    (S32.100D) Closed compression fracture of L4 lumbar vertebra with routine healing, subsequent encounter  Comment:   Plan: DX Hip/Pelvis/Spine        Will check dexa scan. She has been menopausal for 10 years now, just started calcium, one daily, it constipates her    (Z12.31) Encounter for screening mammogram for breast cancer  Comment:   Plan: MA Screening Digital Bilateral        She will schedule this    (E28.39) Estrogen deficiency  Comment:   Plan: DX Hip/Pelvis/Spine        She will schedule this    (Z71.6) Tobacco abuse counseling  Comment:   Plan: not ready to quit    (Z00.00) Health care maintenance  Comment:   Plan: Lipid Profile        She will follow up for labs, fasting  Follow up as needed    Yanna Epperson MD  December 19, 2017

## 2017-12-20 ASSESSMENT — ANXIETY QUESTIONNAIRES: GAD7 TOTAL SCORE: 1

## 2018-04-17 ENCOUNTER — OFFICE VISIT (OUTPATIENT)
Dept: FAMILY MEDICINE | Facility: OTHER | Age: 63
End: 2018-04-17
Attending: NURSE PRACTITIONER
Payer: COMMERCIAL

## 2018-04-17 VITALS
DIASTOLIC BLOOD PRESSURE: 68 MMHG | WEIGHT: 204 LBS | TEMPERATURE: 98.1 F | OXYGEN SATURATION: 94 % | SYSTOLIC BLOOD PRESSURE: 108 MMHG | HEART RATE: 101 BPM | BODY MASS INDEX: 33.95 KG/M2

## 2018-04-17 DIAGNOSIS — H69.91 DYSFUNCTION OF RIGHT EUSTACHIAN TUBE: Primary | ICD-10-CM

## 2018-04-17 PROCEDURE — 99213 OFFICE O/P EST LOW 20 MIN: CPT | Performed by: NURSE PRACTITIONER

## 2018-04-17 RX ORDER — PREDNISONE 20 MG/1
40 TABLET ORAL DAILY
Qty: 10 TABLET | Refills: 0 | Status: SHIPPED | OUTPATIENT
Start: 2018-04-17 | End: 2018-04-22

## 2018-04-17 ASSESSMENT — ANXIETY QUESTIONNAIRES
3. WORRYING TOO MUCH ABOUT DIFFERENT THINGS: NOT AT ALL
2. NOT BEING ABLE TO STOP OR CONTROL WORRYING: NOT AT ALL
1. FEELING NERVOUS, ANXIOUS, OR ON EDGE: NOT AT ALL
5. BEING SO RESTLESS THAT IT IS HARD TO SIT STILL: NOT AT ALL
7. FEELING AFRAID AS IF SOMETHING AWFUL MIGHT HAPPEN: NOT AT ALL
GAD7 TOTAL SCORE: 0
4. TROUBLE RELAXING: NOT AT ALL
6. BECOMING EASILY ANNOYED OR IRRITABLE: NOT AT ALL

## 2018-04-17 ASSESSMENT — PAIN SCALES - GENERAL: PAINLEVEL: MILD PAIN (3)

## 2018-04-17 NOTE — PROGRESS NOTES
SUBJECTIVE:                                                    Corinne S Eden is a 62 year old female who presents to clinic today for the following health issues:      RESPIRATORY SYMPTOMS      Duration: roughly around a week    Description  cough, fever, ear pain right, headache, fatigue/malaise and hoarse voice    Severity: mild    Accompanying signs and symptoms: unable to open jaw. Ear pain is tender. Sensitive to smells and someone had on some perfume last week and just started to get worse. Not sure what is going.    History (predisposing factors):  none    Precipitating or alleviating factors: None    Therapies tried and outcome:  dayquil and nyquil          Problem list and histories reviewed & adjusted, as indicated.  Additional history: as documented    Patient Active Problem List   Diagnosis     Closed fracture of ankle     Tobacco abuse counseling     Closed compression fracture of L4 lumbar vertebra with routine healing, subsequent encounter     Past Surgical History:   Procedure Laterality Date     ------------OTHER-------------      remove polyps from vocal cords      SECTION      son     FRACTURE TX, ANKLE RT/LT Right 2005    surgery x 2       Social History   Substance Use Topics     Smoking status: Current Every Day Smoker     Packs/day: 1.00     Years: 30.00     Smokeless tobacco: Never Used     Alcohol use Yes      Comment: rarely     Family History   Problem Relation Age of Onset     Coronary Artery Disease Mother 73     Myocardial Infarction Mother      DIABETES Brother      Lung Cancer Father 66     DIABETES Maternal Grandmother      Hypertension No family hx of      Hyperlipidemia No family hx of      Breast Cancer No family hx of      Colon Cancer No family hx of      Asthma No family hx of      Thyroid Disease No family hx of          Current Outpatient Prescriptions   Medication Sig Dispense Refill     cyclobenzaprine (FLEXERIL) 5 MG tablet Take 1 tablet (5 mg) by mouth 3  times daily as needed for muscle spasms 40 tablet 0     naproxen (NAPROSYN) 500 MG tablet Take 1 tablet (500 mg) by mouth 2 times daily (with meals) 60 tablet 1     calcium-vitamin D (CALTRATE) 600-400 MG-UNIT per tablet Take 1 tablet by mouth 2 times daily       Allergies   Allergen Reactions     Penicillins        ROS:  CONSTITUTIONAL:chills and fever low grade  INTEGUMENTARY/SKIN: NEGATIVE for worrisome rashes, moles or lesions  ENT/MOUTH: ear pain right and right jaw  RESP:NEGATIVE for significant cough or SOB  CV: NEGATIVE for chest pain, palpitations or peripheral edema  MUSCULOSKELETAL: across the shoulders and base of neck    OBJECTIVE:     /68  Pulse 101  Temp 98.1  F (36.7  C) (Tympanic)  Wt 204 lb (92.5 kg)  SpO2 94%  BMI 33.95 kg/m2  Body mass index is 33.95 kg/(m^2).   GENERAL: alert and no distress  HENT: ear canals and TM's normal, nose and mouth without ulcers or lesions  RESP: lungs clear to auscultation - no rales, rhonchi or wheezes  CV: regular rate and rhythm, normal S1 S2, no S3 or S4, no murmur, click or rub, no peripheral edema and peripheral pulses strong  ABDOMEN: soft, nontender, no hepatosplenomegaly, no masses and bowel sounds normal  MS: tenderness at the base of the skull   SKIN: no suspicious lesions or rashes  PSYCH: mentation appears normal, affect normal/bright  LYMPH: normal ant/post cervical, supraclavicular nodes    Diagnostic Test Results:  none     ASSESSMENT/PLAN:     1. Dysfunction of right eustachian tube  Discussed eustachian tube dysfunction vs TMJ. Corinne has had increased stress due to work and weather over the weekend. Possible causing some TMJ symptoms. Discussed symptomatic treatment. Can use tylenol while on prednisone, but should not use ibuprofen. If symptoms do not improve or worsen she should follow up. Corinne agrees with plan and all questions answered.   - predniSONE (DELTASONE) 20 MG tablet; Take 2 tablets (40 mg) by mouth daily for 5 days   Dispense: 10 tablet; Refill: 0    Tobacco Cessation:   reports that she has been smoking.  She has a 30.00 pack-year smoking history. She has never used smokeless tobacco.  Tobacco Cessation Action Plan: Information offered: Patient not interested at this time    Corinne should follow up with her PCP for yearly physical     See Patient Instructions    DEVORHA Palacios [facility]

## 2018-04-17 NOTE — MR AVS SNAPSHOT
After Visit Summary   4/17/2018    Corinne S Eden    MRN: [id number]           Patient Information     Date Of Birth          1955        Visit Information        Provider Department      4/17/2018 2:30 PM Sandra Zamora APRN [facility]        Today's Diagnoses     Dysfunction of right eustachian tube    -  1      Care Instructions      Earache, No Infection (Adult)  Earaches can happen without an infection. This occurs when air and fluid build up behind the eardrum causing a feeling of fullness and discomfort and reduced hearing. This is called otitis media with effusion (OME) or serous otitis media. It means there is fluid in the middle ear. It is not the same as acute otitis media, which is typically from infection.  OME can happen when you have a cold if congestion blocks the passage that drains the middle ear. This passage is called the eustachian tube. OME may also occur with nasal allergies or after a bacterial middle ear infection.    The pain or discomfort may come and go. You may hear clicking or popping sounds when you chew or swallow. You may feel that your balance is off. Or you may hear ringing in the ear.  It often takes from several weeks up to 3 months for the fluid to clear on its own. Oral pain relievers and ear drops help if there is pain. Decongestants and antihistamines sometimes help. Antibiotics don't help since there is no infection. Your doctor may prescribe a nasal spray to help reduce swelling in the nose and eustachian tube. This can allow the ear to drain.  If your OME doesn't improve after 3 months, surgery may be used to drain the fluid and insert a small tube in the eardrum to allow continued drainage.  Because the middle ear fluid can become infected, it is important to watch for signs of an ear infection which may develop later. These signs include increased ear pain, fever, or drainage from the ear.  Home care  The following  guidelines will help you care for yourself at home:    You may use over-the-counter medicine as directed to control pain, unless another medicine was prescribed. If you have chronic liver or kidney disease or ever had a stomach ulcer or GI bleeding, talk with your doctor before using these medicines. Aspirin should never be used in anyone under 18 years of age who is ill with a fever. It may cause severe liver damage.    You may use over-the-counter decongestants such as phenylephrine or pseudoephedrine. But they are not always helpful. Don't use nasal spray decongestants more than 3 days. Longer use can make congestion worse. Prescription nasal sprays from your doctor don't typically have those restrictions.    Antihistamines may help if you are also having allergy symptoms.    You may use medicines such as guaifenesin to thin mucus and promote drainage.  Follow-up care  Follow up with your healthcare provider or as advised if you are not feeling better after 3 days.  When to seek medical advice  Call your healthcare provider right away if any of the following occur:    Your ear pain gets worse or does not start to improve     Fever of 100.4 F (38 C) or higher, or as directed by your healthcare provider    Fluid or blood draining from the ear    Headache or sinus pain    Stiff neck    Unusual drowsiness or confusion  Date Last Reviewed: 10/1/2016    4168-2510 [employer]. [address] [place], [place] [id number]. All rights reserved. This information is not intended as a substitute for professional medical care. Always follow your healthcare professional's instructions.        Self-Care for Temporomandibular Disorders (TMD)  You have temporomandibular disorder (TMD). This term describes a group of problems related to the temporomandibular joint (TMJ) and nearby muscles. The TMJ is located where the upper and lower jaws meet. Treatment will get your jaw back to normal function. But your care doesn t  end there. Once you ve had TMD, it s important to avoid reinjury. Get in the habit of doing self-checks. This can make you aware of any symptoms that begin to return, so you can take action right away.    Doing self-checks  Make it a habit to assess your body a few times each day. Try writing yourself a reminder. Or set an alarm on your watch or computer. When doing a self-check, ask yourself:    Do I feel stressed?    Are my muscles tense?    Am I grinding or clenching my teeth?    Is my posture healthy for my body?    Is there anything I can do to make myself more comfortable?  If you answer  yes  to any of the questions above, you need to take action. Adjusting your posture or taking a short break can help prevent or relieve TMD symptoms.  Listening to your body  Many people get used to ignoring pain. But pain is a signal that your body needs care. To maintain your TMJ health:    Avoid hard or chewy foods. Even if you feel fine, eating such foods can trigger symptoms again.    Be aware of your body. Don t ignore TMD symptoms. The nagging pain in your neck or jaw may be a sign that you need care.    Be sure to keep follow-up appointments with your health care team.  Managing stress  Stress is a key factor in TMD. Stress can cause you to clench your muscles or grind your teeth. It can also affect your sleep, reducing your body s ability to heal. Here are a few tips to manage stress:    Learn ways to relax. Try listening to music or gently stretching. Take a few slow deep breaths. Or, close your eyes and imagine a place or object that is calming.    Get plenty of rest and sleep.    Set goals you know you can attain.    Make time for people and things you enjoy.    Ask for help if you need it. Friends and family can run errands and cook meals for you.   Staying active  Activity helps the body in many ways. You stay looser and more relaxed. It also helps keep muscles and tissues conditioned. That way you can heal  "faster and make reinjury less likely. Here are some tips to get you started:    Talk to your health care provider before starting an exercise program.    Always warm up and stretch before each activity. This helps prevent injury.    Try walking or swimming. These activities are easy on your joints. They also benefit your heart and lungs.    Try yoga or sis chi. These are relaxing activities known for reducing stress.  Date Last Reviewed: 7/13/2015 2000-2017 [employer]. [address] [place] [address]. All rights reserved. This information is not intended as a substitute for professional medical care. Always follow your healthcare professional's instructions.                Follow-ups after your visit        Who to contact     If you have questions or need follow up information about today's clinic visit or your schedule please contact [facility] directly at [phone].  Normal or non-critical lab and imaging results will be communicated to you by MyChart, letter or phone within 4 business days after the clinic has received the results. If you do not hear from us within 7 days, please contact the clinic through [employer] or phone. If you have a critical or abnormal lab result, we will notify you by phone as soon as possible.  Submit refill requests through [employer] or call your pharmacy and they will forward the refill request to us. Please allow 3 business days for your refill to be completed.          Additional Information About Your Visit        [facility] Information     [employer] lets you send messages to your doctor, view your test results, renew your prescriptions, schedule appointments and more. To sign up, go to [email or web address] . Click on \"Log in\" on the left side of the screen, which will take you to the Welcome page. Then click on \"Sign up Now\" on the right side of the page.     You will be asked to enter the access code listed below, as well as some personal " information. Please follow the directions to create your username and password.     Your access code is: [email or web address]  Expires: 2018  2:40 PM     Your access code will  in 90 days. If you need help or a new code, please call your [facility] clinic or [phone].        Care EveryWhere ID     This is your Care EveryWhere ID. This could be used by other organizations to access your [facility] medical records  [id number]        Your Vitals Were     Pulse Temperature Pulse Oximetry BMI (Body Mass Index)          101 98.1  F (36.7  C) (Tympanic) 94% 33.95 kg/m2         Blood Pressure from Last 3 Encounters:   18 108/68   17 126/74   17 128/70    Weight from Last 3 Encounters:   18 204 lb (92.5 kg)   17 210 lb (95.3 kg)   17 210 lb (95.3 kg)              Today, you had the following     No orders found for display         Today's Medication Changes          These changes are accurate as of 18  2:40 PM.  If you have any questions, ask your nurse or doctor.               Start taking these medicines.        Dose/Directions    predniSONE 20 MG tablet   Commonly known as:  DELTASONE   Used for:  Dysfunction of right eustachian tube   Started by:  Sandra Zamora APRN CNP        Dose:  40 mg   Take 2 tablets (40 mg) by mouth daily for 5 days   Quantity:  10 tablet   Refills:  0            Where to get your medicines      [facility] [address] [place] [address] [place], [place] -  [address] AT [facility] [address], [place] [address]     Phone:  [phone]     predniSONE 20 MG tablet                Primary Care Provider Office Phone # Fax #    Yanna Epperson [phone]       [address] [place] [address]  [place] [address]        Equal Access to Services     [facility] YVONNE AH: Connie boldeno Sokenzie, waaxda luqadaha, qaybta kaalmatobias jones, tiana dominguez . So [facility]  [phone].    ATENCIÓN: Si rhona atwood, tiene a oquendo disposición servicios gratuitos de asistencia lingüística. Lennox alejandro [phone].    We comply with applicable federal civil rights laws and [place] laws. We do not discriminate on the basis of race, color, national origin, age, disability, sex, sexual orientation, or gender identity.            Thank you!     Thank you for choosing [facility]  for your care. Our goal is always to provide you with excellent care. Hearing back from our patients is one way we can continue to improve our services. Please take a few minutes to complete the written survey that you may receive in the mail after your visit with us. Thank you!             Your Updated Medication List - Protect others around you: Learn how to safely use, store and throw away your medicines at www.disposemymeds.org.          This list is accurate as of 4/17/18  2:40 PM.  Always use your most recent med list.                   Brand Name Dispense Instructions for use Diagnosis    calcium-vitamin D 600-400 MG-UNIT per tablet    CALTRATE     Take 1 tablet by mouth 2 times daily        cyclobenzaprine 5 MG tablet    FLEXERIL    40 tablet    Take 1 tablet (5 mg) by mouth 3 times daily as needed for muscle spasms    Osteoporotic compression fracture of spine, with routine healing, subsequent encounter       naproxen 500 MG tablet    NAPROSYN    60 tablet    Take 1 tablet (500 mg) by mouth 2 times daily (with meals)    Osteoporotic compression fracture of spine, with routine healing, subsequent encounter       predniSONE 20 MG tablet    DELTASONE    10 tablet    Take 2 tablets (40 mg) by mouth daily for 5 days    Dysfunction of right eustachian tube

## 2018-04-17 NOTE — PATIENT INSTRUCTIONS
Earache, No Infection (Adult)  Earaches can happen without an infection. This occurs when air and fluid build up behind the eardrum causing a feeling of fullness and discomfort and reduced hearing. This is called otitis media with effusion (OME) or serous otitis media. It means there is fluid in the middle ear. It is not the same as acute otitis media, which is typically from infection.  OME can happen when you have a cold if congestion blocks the passage that drains the middle ear. This passage is called the eustachian tube. OME may also occur with nasal allergies or after a bacterial middle ear infection.    The pain or discomfort may come and go. You may hear clicking or popping sounds when you chew or swallow. You may feel that your balance is off. Or you may hear ringing in the ear.  It often takes from several weeks up to 3 months for the fluid to clear on its own. Oral pain relievers and ear drops help if there is pain. Decongestants and antihistamines sometimes help. Antibiotics don't help since there is no infection. Your doctor may prescribe a nasal spray to help reduce swelling in the nose and eustachian tube. This can allow the ear to drain.  If your OME doesn't improve after 3 months, surgery may be used to drain the fluid and insert a small tube in the eardrum to allow continued drainage.  Because the middle ear fluid can become infected, it is important to watch for signs of an ear infection which may develop later. These signs include increased ear pain, fever, or drainage from the ear.  Home care  The following guidelines will help you care for yourself at home:    You may use over-the-counter medicine as directed to control pain, unless another medicine was prescribed. If you have chronic liver or kidney disease or ever had a stomach ulcer or GI bleeding, talk with your doctor before using these medicines. Aspirin should never be used in anyone under 18 years of age who is ill with a fever. It  may cause severe liver damage.    You may use over-the-counter decongestants such as phenylephrine or pseudoephedrine. But they are not always helpful. Don't use nasal spray decongestants more than 3 days. Longer use can make congestion worse. Prescription nasal sprays from your doctor don't typically have those restrictions.    Antihistamines may help if you are also having allergy symptoms.    You may use medicines such as guaifenesin to thin mucus and promote drainage.  Follow-up care  Follow up with your healthcare provider or as advised if you are not feeling better after 3 days.  When to seek medical advice  Call your healthcare provider right away if any of the following occur:    Your ear pain gets worse or does not start to improve     Fever of 100.4 F (38 C) or higher, or as directed by your healthcare provider    Fluid or blood draining from the ear    Headache or sinus pain    Stiff neck    Unusual drowsiness or confusion  Date Last Reviewed: 10/1/2016    2212-9178 [employer]. [address] [place] [address]. All rights reserved. This information is not intended as a substitute for professional medical care. Always follow your healthcare professional's instructions.        Self-Care for Temporomandibular Disorders (TMD)  You have temporomandibular disorder (TMD). This term describes a group of problems related to the temporomandibular joint (TMJ) and nearby muscles. The TMJ is located where the upper and lower jaws meet. Treatment will get your jaw back to normal function. But your care doesn t end there. Once you ve had TMD, it s important to avoid reinjury. Get in the habit of doing self-checks. This can make you aware of any symptoms that begin to return, so you can take action right away.    Doing self-checks  Make it a habit to assess your body a few times each day. Try writing yourself a reminder. Or set an alarm on your watch or computer. When doing a self-check, ask  yourself:    Do I feel stressed?    Are my muscles tense?    Am I grinding or clenching my teeth?    Is my posture healthy for my body?    Is there anything I can do to make myself more comfortable?  If you answer  yes  to any of the questions above, you need to take action. Adjusting your posture or taking a short break can help prevent or relieve TMD symptoms.  Listening to your body  Many people get used to ignoring pain. But pain is a signal that your body needs care. To maintain your TMJ health:    Avoid hard or chewy foods. Even if you feel fine, eating such foods can trigger symptoms again.    Be aware of your body. Don t ignore TMD symptoms. The nagging pain in your neck or jaw may be a sign that you need care.    Be sure to keep follow-up appointments with your health care team.  Managing stress  Stress is a key factor in TMD. Stress can cause you to clench your muscles or grind your teeth. It can also affect your sleep, reducing your body s ability to heal. Here are a few tips to manage stress:    Learn ways to relax. Try listening to music or gently stretching. Take a few slow deep breaths. Or, close your eyes and imagine a place or object that is calming.    Get plenty of rest and sleep.    Set goals you know you can attain.    Make time for people and things you enjoy.    Ask for help if you need it. Friends and family can run errands and cook meals for you.   Staying active  Activity helps the body in many ways. You stay looser and more relaxed. It also helps keep muscles and tissues conditioned. That way you can heal faster and make reinjury less likely. Here are some tips to get you started:    Talk to your health care provider before starting an exercise program.    Always warm up and stretch before each activity. This helps prevent injury.    Try walking or swimming. These activities are easy on your joints. They also benefit your heart and lungs.    Try yoga or sis chi. These are relaxing  activities known for reducing stress.  Date Last Reviewed: 7/13/2015 2000-2017 [employer]. [address] [place] [address], [place] [address]. All rights reserved. This information is not intended as a substitute for professional medical care. Always follow your healthcare professional's instructions.

## 2018-04-17 NOTE — NURSING NOTE
"Chief Complaint   Patient presents with     URI       Initial /68  Pulse 101  Temp 98.1  F (36.7  C) (Tympanic)  Wt 204 lb (92.5 kg)  SpO2 94%  BMI 33.95 kg/m2 Estimated body mass index is 33.95 kg/(m^2) as calculated from the following:    Height as of 12/19/17: 5' 5\" (1.651 m).    Weight as of this encounter: 204 lb (92.5 kg).  Medication Reconciliation: complete     Ashley Addison    "

## 2018-04-18 ASSESSMENT — PATIENT HEALTH QUESTIONNAIRE - PHQ9: SUM OF ALL RESPONSES TO PHQ QUESTIONS 1-9: 5

## 2018-04-18 ASSESSMENT — ANXIETY QUESTIONNAIRES: GAD7 TOTAL SCORE: 0

## 2018-04-24 ENCOUNTER — HEALTH MAINTENANCE LETTER (OUTPATIENT)
Age: 63
End: 2018-04-24

## 2018-04-28 ENCOUNTER — HOSPITAL ENCOUNTER (EMERGENCY)
Facility: HOSPITAL | Age: 63
Discharge: HOME | End: 2018-04-28
Attending: NURSE PRACTITIONER | Admitting: NURSE PRACTITIONER
Payer: COMMERCIAL

## 2018-04-28 VITALS
SYSTOLIC BLOOD PRESSURE: 145 MMHG | TEMPERATURE: 98.1 F | OXYGEN SATURATION: 98 % | DIASTOLIC BLOOD PRESSURE: 79 MMHG | RESPIRATION RATE: 20 BRPM

## 2018-04-28 DIAGNOSIS — Z72.0 TOBACCO ABUSE: ICD-10-CM

## 2018-04-28 DIAGNOSIS — J02.9 ACUTE PHARYNGITIS, UNSPECIFIED ETIOLOGY: ICD-10-CM

## 2018-04-28 LAB
DEPRECATED S PYO AG THROAT QL EIA: NORMAL
SPECIMEN SOURCE: NORMAL

## 2018-04-28 PROCEDURE — 87880 STREP A ASSAY W/OPTIC: CPT | Performed by: FAMILY MEDICINE

## 2018-04-28 PROCEDURE — 87081 CULTURE SCREEN ONLY: CPT | Performed by: FAMILY MEDICINE

## 2018-04-28 PROCEDURE — G0463 HOSPITAL OUTPT CLINIC VISIT: HCPCS

## 2018-04-28 PROCEDURE — 99213 OFFICE O/P EST LOW 20 MIN: CPT | Performed by: NURSE PRACTITIONER

## 2018-04-28 RX ORDER — DEXAMETHASONE 2 MG/1
TABLET ORAL
Qty: 9 TABLET | Refills: 0 | Status: SHIPPED | OUTPATIENT
Start: 2018-04-28 | End: 2018-05-15

## 2018-04-28 RX ORDER — DEXAMETHASONE 2 MG/1
TABLET ORAL
Qty: 9 TABLET | Refills: 0 | Status: SHIPPED | OUTPATIENT
Start: 2018-04-28 | End: 2018-04-28

## 2018-04-28 ASSESSMENT — ENCOUNTER SYMPTOMS
CHILLS: 0
FEVER: 1
COUGH: 1
APPETITE CHANGE: 0
ROS GI COMMENTS: DENIES GERD SYMPTOMS
VOMITING: 0
FATIGUE: 1
SORE THROAT: 1
NAUSEA: 0
ABDOMINAL PAIN: 0

## 2018-04-28 NOTE — ED AVS SNAPSHOT
[facility]    [address] [address]    Phone:  [phone]                                       Corinne S Eden   MRN: [id number]    Department:  [facility]   Date of Visit:  4/28/2018           Patient Information     Date Of Birth          1955        Your diagnoses for this visit were:     Acute pharyngitis, unspecified etiology     Tobacco abuse        You were seen by Julee Ontiveros NP.      Follow-up Information     Follow up with [facility].    Specialty:  EMERGENCY MEDICINE    Why:  As needed, If symptoms worsen, or concerns develop    Contact information:    [address] [place] [address] [phone]    Additional information:    From [facility]: Take [address]. Turn left at [address]. Turn left at the first stoplight on [address]. At the first stop sign, take a right onto [address]. Take a left into the parking lot and continue through until you reach the North enterance of the building.       From [place]: Take [address]. Take the [address] ramp towards [place]. Turn left onto [address]. Take a slight right onto [address]. Turn left at the first stoplight on [address]. At the first stop sign, take a right onto [address]. Take a left into the parking lot and continue through until you reach the North enterance of the building.       From [place]: Take [address]. Take a right at [address]. At the first stop sign, take a right onto [address]. Take a left into the parking lot and continue through until you reach the North enterance of the building.         Follow up with Yanna Epperson MD.    Specialty:  Family Practice    Why:  3-5 day if symptoms are not completely improved    Contact information:    [address]  [facility] [address]  [phone]        Discharge References/Attachments     PHARYNGITIS,  REPORT PENDING (ENGLISH)    SORE THROATS, SELF-CARE FOR (ENGLISH)    SMOKING CESSATION (ENGLISH)         Review of your medicines      START taking        Dose / Directions Last dose taken    dexamethasone 2 MG tablet   Commonly known as:  DECADRON   Quantity:  9 tablet        Take 8 mg on day 1, 6 mg on day 2, 4 mg on day 3 then discontinue   Refills:  0          Our records show that you are taking the medicines listed below. If these are incorrect, please call your family doctor or clinic.        Dose / Directions Last dose taken    calcium-vitamin D 600-400 MG-UNIT per tablet   Commonly known as:  CALTRATE   Dose:  1 tablet        Take 1 tablet by mouth 2 times daily   Refills:  0        cyclobenzaprine 5 MG tablet   Commonly known as:  FLEXERIL   Dose:  5 mg   Quantity:  40 tablet        Take 1 tablet (5 mg) by mouth 3 times daily as needed for muscle spasms   Refills:  0        naproxen 500 MG tablet   Commonly known as:  NAPROSYN   Dose:  500 mg   Quantity:  60 tablet        Take 1 tablet (500 mg) by mouth 2 times daily (with meals)   Refills:  1                Prescriptions were sent or printed at these locations (1 Prescription)                   [email or web address] - [place], [place] - [address] AT [facility] [address]   [address], [facility] [address]    Telephone:  [phone]   Fax:  [phone]   Hours:                  E-Prescribed (1 of 1)         dexamethasone (DECADRON) 2 MG tablet                Procedures and tests performed during your visit     Beta strep group A culture    Rapid strep screen      Orders Needing Specimen Collection     None      Pending Results     Date and Time Order Name Status Description    4/28/2018 1721 Beta strep group A culture In process             Pending Culture Results     Date and Time Order Name Status Description    4/28/2018 1721 Beta strep group A culture In process             Thank you for choosing [facility]       Thank you for choosing  "[facility] for your care. Our goal is always to provide you with excellent care. Hearing back from our patients is one way we can continue to improve our services. Please take a few minutes to complete the written survey that you may receive in the mail after you visit with us. Thank you!        [employer] Information     [employer] lets you send messages to your doctor, view your test results, renew your prescriptions, schedule appointments and more. To sign up, go to [email or web address] . Click on \"Log in\" on the left side of the screen, which will take you to the Welcome page. Then click on \"Sign up Now\" on the right side of the page.     You will be asked to enter the access code listed below, as well as some personal information. Please follow the directions to create your username and password.     Your access code is: [email or web address]  Expires: 2018  2:40 PM     Your access code will  in 90 days. If you need help or a new code, please call your [facility] clinic or [phone].        Care EveryWhere ID     This is your Care EveryWhere ID. This could be used by other organizations to access your [facility] medical records  [id number]        Equal Access to Services     ABEL RUSSELL : Connie Odell, rena lopez, darell jones, tiana morales. So [facility] [phone].    ATENCIÓN: Si habla español, tiene a oquendo disposición servicios gratuitos de asistencia lingüística. Llame al [phone].    We comply with applicable federal civil rights laws and [place] laws. We do not discriminate on the basis of race, color, national origin, age, disability, sex, sexual orientation, or gender identity.            After Visit Summary       This is your record. Keep this with you and show to your community pharmacist(s) and doctor(s) at your next visit.                  "

## 2018-04-28 NOTE — ED PROVIDER NOTES
History     Chief Complaint   Patient presents with     Pharyngitis     3-4 days.      The history is provided by the patient. No  was used.     Corinne S Eden is a 62 year old female who presents today with a CC of sore throat and fatigue x 3-4 days.  She was on a [place] cruise and the sore throat started.  She initially thought it was from the AC on the cruise, it was worse at night, better during the day when she was upright and drinking fluids.  Today she notes it has been bad all day.  She has been gargling salt water, taking tylenol, drinking lots of fluids with improvement.  She is a long term smoker, she smokes 1 ppd.  She has a raspy voice at baseline, has a history of polyps removed from vocal cords in the past, states voice is slightly more raspy than normal.   She has a history of pharyngitis and strep throat as an adult, no history of head/neck CA.      Problem List:    Patient Active Problem List    Diagnosis Date Noted     Tobacco abuse counseling 2017     Priority: Medium     Closed compression fracture of L4 lumbar vertebra with routine healing, subsequent encounter 2017     Priority: Medium     Closed fracture of ankle 2006     Priority: Medium     Overview:   IMO Update 10/11          Past Medical History:    History reviewed. No pertinent past medical history.    Past Surgical History:    Past Surgical History:   Procedure Laterality Date     ------------OTHER-------------      remove polyps from vocal cords      SECTION      son     FRACTURE TX, ANKLE RT/LT Right     surgery x 2       Family History:    Family History   Problem Relation Age of Onset     Coronary Artery Disease Mother 73     Myocardial Infarction Mother      DIABETES Brother      Lung Cancer Father 66     DIABETES Maternal Grandmother      Hypertension No family hx of      Hyperlipidemia No family hx of      Breast Cancer No family hx of      Colon Cancer No family hx of       Asthma No family hx of      Thyroid Disease No family hx of        Social History:  Marital Status:  Single [1]  Social History   Substance Use Topics     Smoking status: Current Every Day Smoker     Packs/day: 1.00     Years: 30.00     Smokeless tobacco: Never Used     Alcohol use Yes      Comment: rarely        Medications:      dexamethasone (DECADRON) 2 MG tablet   calcium-vitamin D (CALTRATE) 600-400 MG-UNIT per tablet   cyclobenzaprine (FLEXERIL) 5 MG tablet   naproxen (NAPROSYN) 500 MG tablet   [DISCONTINUED] dexamethasone (DECADRON) 2 MG tablet         Review of Systems   Constitutional: Positive for fatigue and fever (feels a little warm). Negative for appetite change and chills.   HENT: Positive for sore throat. Negative for congestion.    Respiratory: Positive for cough.    Gastrointestinal: Negative for abdominal pain, nausea and vomiting.        Denies GERD symptoms       Physical Exam   BP: 145/79  Heart Rate: 92  Temp: 98.1  F (36.7  C)  Resp: 20  SpO2: 98 %      Physical Exam   Constitutional: She is oriented to person, place, and time. She appears well-developed. She is cooperative. She does not appear ill.   HENT:   Head: Normocephalic and atraumatic.   Right Ear: Tympanic membrane, external ear and ear canal normal.   Left Ear: Tympanic membrane, external ear and ear canal normal.   Mouth/Throat: No oral lesions. No trismus in the jaw. No uvula swelling. Posterior oropharyngeal edema (tonsils 2+ bilaterally) and posterior oropharyngeal erythema (mild) present. No oropharyngeal exudate or tonsillar abscesses (no obvious).   Lingual tonsillar hypertrophy noted base of tongue   Neck: Normal range of motion. Neck supple.   Cardiovascular: Normal rate and regular rhythm.    Pulmonary/Chest: Effort normal and breath sounds normal.   Lymphadenopathy:        Head (right side): Submandibular (mild, < 1 cm, freely moveable, ttp) and tonsillar (mild, < 1 cm, freely moveable, ttp) adenopathy present.         Head (left side): Submandibular (mild, < 1 cm, freely moveable, ttp) and tonsillar (mild, < 1 cm, freely moveable, ttp) adenopathy present.     She has no cervical adenopathy.   Neurological: She is alert and oriented to person, place, and time.   Skin: Skin is warm and dry.   Psychiatric: She has a normal mood and affect. Her behavior is normal.   Nursing note and vitals reviewed.      ED Course     ED Course     Procedures    Results for orders placed or performed during the hospital encounter of 04/28/18   Rapid strep screen   Result Value Ref Range    Specimen Description Throat     Rapid Strep A Screen       NEGATIVE: No Group A streptococcal antigen detected by immunoassay, await culture report.       Assessments & Plan (with Medical Decision Making)     I have reviewed the nursing notes.    I have reviewed the findings, diagnosis, plan and need for follow up with the patient.  ASSESSMENT / PLAN:  (J02.9) Acute pharyngitis, unspecified etiology  Comment: rapid strep negative  Plan:  Decadron as prescribed   The rapid strep was negative, the strep culture is pending, we will call you with positive results only and treat with antibiotics as needed   We had a long discussion regarding other differentials for pharyngitis, with her lingual tonsil hypertrophy and raspy voice she could be having some silent reflux, also need to keep in mind her tobacco history and not overlook a possible head/neck CA.  Patient verbalized understanding and states if symptoms are not completely resolved in 1 week she will follow up with PCP for recheck and possible ENT consult for scope.    Warm salt water gargles several times per day   Ibuprofen and tylenol per package directions for pain/fever   Cepacol lozenges OTC per package directions   Increase fluids, wash hands frequently, rest   Patient verbally educated and given appropriate education sheets for their diagnoses and has no questions.   Return to ED/UC if symptoms increase  "or concerns develop, red flag symptoms as discussed and per discharge instructions, increasing throat pain, trouble swallowing,  \"hot potato voice\", drooling, shortness of breath/airway compromise   Follow up with your Primary Care provider if symptoms do not improve in 2-3 days    (Z72.0) Tobacco abuse  Comment: thinking about quitting  Plan:  Smoking cessation information given      Discharge Medication List as of 4/28/2018  6:00 PM      START taking these medications    Details   dexamethasone (DECADRON) 2 MG tablet Take 8 mg on day 1, 6 mg on day 2, 4 mg on day 3 then discontinue, Disp-9 tablet, R-0, E-Prescribe             Final diagnoses:   Acute pharyngitis, unspecified etiology   Tobacco abuse       4/28/2018   [facility]     Julee Ontiveros NP  04/28/18 1926    "

## 2018-04-28 NOTE — ED AVS SNAPSHOT
[facility]    [address] [address]    Phone:  [phone]                                       Corinne S Eden   MRN: [id number]    Department:  [facility]   Date of Visit:  4/28/2018           After Visit Summary Signature Page     I have received my discharge instructions, and my questions have been answered. I have discussed any challenges I see with this plan with the nurse or doctor.    ..........................................................................................................................................  Patient/Patient Representative Signature      ..........................................................................................................................................  Patient Representative Print Name and Relationship to Patient    ..................................................               ................................................  Date                                            Time    ..........................................................................................................................................  Reviewed by Signature/Title    ...................................................              ..............................................  Date                                                            Time

## 2018-04-30 LAB
BACTERIA SPEC CULT: NORMAL
SPECIMEN SOURCE: NORMAL

## 2018-05-15 ENCOUNTER — OFFICE VISIT (OUTPATIENT)
Dept: FAMILY MEDICINE | Facility: OTHER | Age: 63
End: 2018-05-15
Attending: FAMILY MEDICINE
Payer: COMMERCIAL

## 2018-05-15 ENCOUNTER — RADIANT APPOINTMENT (OUTPATIENT)
Dept: GENERAL RADIOLOGY | Facility: OTHER | Age: 63
End: 2018-05-15
Attending: FAMILY MEDICINE
Payer: COMMERCIAL

## 2018-05-15 ENCOUNTER — TELEPHONE (OUTPATIENT)
Dept: FAMILY MEDICINE | Facility: OTHER | Age: 63
End: 2018-05-15

## 2018-05-15 VITALS
DIASTOLIC BLOOD PRESSURE: 78 MMHG | SYSTOLIC BLOOD PRESSURE: 130 MMHG | HEIGHT: 65 IN | BODY MASS INDEX: 33.99 KG/M2 | OXYGEN SATURATION: 95 % | WEIGHT: 204 LBS | RESPIRATION RATE: 19 BRPM | HEART RATE: 91 BPM

## 2018-05-15 DIAGNOSIS — R06.02 SHORTNESS OF BREATH: Primary | ICD-10-CM

## 2018-05-15 DIAGNOSIS — Z72.0 TOBACCO ABUSE: ICD-10-CM

## 2018-05-15 DIAGNOSIS — K21.9 LPRD (LARYNGOPHARYNGEAL REFLUX DISEASE): ICD-10-CM

## 2018-05-15 DIAGNOSIS — R06.02 SHORTNESS OF BREATH: ICD-10-CM

## 2018-05-15 DIAGNOSIS — Z71.6 TOBACCO ABUSE COUNSELING: ICD-10-CM

## 2018-05-15 DIAGNOSIS — R07.0 THROAT PAIN: ICD-10-CM

## 2018-05-15 PROCEDURE — 71046 X-RAY EXAM CHEST 2 VIEWS: CPT | Mod: TC

## 2018-05-15 PROCEDURE — 99214 OFFICE O/P EST MOD 30 MIN: CPT | Performed by: FAMILY MEDICINE

## 2018-05-15 RX ORDER — ALBUTEROL SULFATE 90 UG/1
2 AEROSOL, METERED RESPIRATORY (INHALATION) EVERY 6 HOURS PRN
Qty: 1 INHALER | Refills: 1 | Status: SHIPPED | OUTPATIENT
Start: 2018-05-15 | End: 2020-08-27

## 2018-05-15 RX ORDER — FLUTICASONE PROPIONATE 220 UG/1
2 AEROSOL, METERED RESPIRATORY (INHALATION) 2 TIMES DAILY
Qty: 1 INHALER | Refills: 1 | Status: SHIPPED | OUTPATIENT
Start: 2018-05-15 | End: 2023-06-14

## 2018-05-15 RX ORDER — ALBUTEROL SULFATE 0.83 MG/ML
1 SOLUTION RESPIRATORY (INHALATION) EVERY 6 HOURS PRN
Qty: 50 VIAL | Refills: 3 | Status: SHIPPED | OUTPATIENT
Start: 2018-05-15 | End: 2020-04-08

## 2018-05-15 ASSESSMENT — ANXIETY QUESTIONNAIRES
5. BEING SO RESTLESS THAT IT IS HARD TO SIT STILL: NOT AT ALL
IF YOU CHECKED OFF ANY PROBLEMS ON THIS QUESTIONNAIRE, HOW DIFFICULT HAVE THESE PROBLEMS MADE IT FOR YOU TO DO YOUR WORK, TAKE CARE OF THINGS AT HOME, OR GET ALONG WITH OTHER PEOPLE: NOT DIFFICULT AT ALL
6. BECOMING EASILY ANNOYED OR IRRITABLE: NOT AT ALL
1. FEELING NERVOUS, ANXIOUS, OR ON EDGE: NOT AT ALL
2. NOT BEING ABLE TO STOP OR CONTROL WORRYING: NOT AT ALL
4. TROUBLE RELAXING: NOT AT ALL
7. FEELING AFRAID AS IF SOMETHING AWFUL MIGHT HAPPEN: NOT AT ALL
3. WORRYING TOO MUCH ABOUT DIFFERENT THINGS: NOT AT ALL
GAD7 TOTAL SCORE: 0

## 2018-05-15 ASSESSMENT — PAIN SCALES - GENERAL: PAINLEVEL: NO PAIN (0)

## 2018-05-15 NOTE — PATIENT INSTRUCTIONS

## 2018-05-15 NOTE — MR AVS SNAPSHOT
After Visit Summary   5/15/2018    Corinne S Eden    MRN: [id number]           Patient Information     Date Of Birth          1955        Visit Information        Provider Department      5/15/2018 10:30 AM Yanna Epperson MD [facility]        Today's Diagnoses     Shortness of breath    -  1    Tobacco abuse        Tobacco abuse counseling        Throat pain        LPRD (laryngopharyngeal reflux disease)          Care Instructions      HOW TO QUIT SMOKING  Smoking is one of the hardest habits to break. About half of all those who have ever smoked have been able to quit, and most of those (about 70%) who still smoke want to quit. Here are some of the best ways to stop smoking.     KEEP TRYING:  It takes most smokers about 8 tries before they are finally able to fully quit. So, the more often you try and fail, the better your chance of quitting the next time! So, don't give up!    GO COLD TURKEY:  Most ex-smokers quit cold turkey. Trying to cut back gradually doesn't seem to work as well, perhaps because it continues the smoking habit. Also, it is possible to fool yourself by inhaling more while smoking fewer cigarettes. This results in the same amount of nicotine in your body!    GET SUPPORT:  Support programs can make an important difference, especially for the heavy smoker. These groups offer lectures, methods to change your behavior and peer support. Call the free national Quitline for more information. 800-QUIT-NOW (059-074-3703). Low-cost or free programs are offered by many hospitals, local chapters of the American Lung Association (347-961-7214) and the American Cancer Society (963-148-8251). Support at home is important too. Non-smokers can help by offering praise and encouragement. If the smoker fails to quit, encourage them to try again!    OVER-THE-COUNTER MEDICINES:  For those who can't quit on their own, Nicotine Replacement Therapy (NRT) may make quitting much easier.  Certain aids such as the nicotine patch, gum and lozenge are available without a prescription. However, it is best to use these under the guidance of your doctor. The skin patch provides a steady supply of nicotine to the body. Nicotine gum and lozenge gives temporary bursts of low levels of nicotine. Both methods take the edge off the craving for cigarettes. WARNING: If you feel symptoms of nicotine overdose, such as nausea, vomiting, dizziness, weakness, or fast heartbeat, stop using these and see your doctor.    PRESCRIPTION MEDICINES:  After evaluating your smoking patterns and prior attempts at quitting, your doctor may offer a prescription medicine such as bupropion (Zyban, Wellbutrin), varenicline (Chantix, Champix), a niocotine inhaler or nasal spray. Each has its unique advantage and side effects which your doctor can review with you.    HEALTH BENEFITS OF QUITTING:  The benefits of quitting start right away and keep improving the longer you go without smokin minutes: blood pressure and pulse return to normal  8 hours: oxygen levels return to normal  2 days: ability to smell and taste begins to improve as damaged nerves start to regrow  2-3 weeks: circulation and lung function improves  1-9 months: decreased cough, congestion and shortness of breath; less tired  1 year: risk of heart attack decreases by half  5 years: risk of lung cancer decreases by half; risk of stroke becomes the same as a non-smoker  For information about how to quit smoking, visit the following links:  National Cancer [place] ,   Clearing the Air, Quit Smoking Today   - an online booklet. http://www.smokefree.gov/pubs/clearing_the_air.pdf  Smokefree.gov http://smokefree.gov/  QuitNet http://www.quitnet.com/    4807-1600 Randi Mohr, [address] [place] [address], [place] [address]. All rights reserved. This information is not intended as a substitute for professional medical care. Always follow your healthcare professional's  instructions.    The Benefits of Living Smoke Free  What do you want to gain from quitting? Check off some reasons to quit.  Health Benefits  ___ Reduce my risk of lung cancer, heart disease, chronic lung disease  ___ Have fewer wrinkles and softer skin  ___ Improve my sense of taste and smell  ___ For pregnant women--reduce the risk of having a miscarriage, stillbirth, premature birth, or low-birth-weight baby  Personal Benefits  ___ Feel more in control of my life  ___ Have better-smelling hair, breath, clothes, home, and car  ___ Save time by not having to take smoke breaks, buy cigarettes, or hunt for a light  ___ Have whiter teeth  Family Benefits  ___ Reduce my children s respiratory tract infections  ___ Set a good example for my children  ___ Reduce my family s cancer risk  Financial Benefits  ___ Save hundreds of dollars each year that would be spent on cigarettes  ___ Save money on medical bills  ___ Save on life, health, and car insurance premiums    Those Dollars Add Up!  Cigarettes are expensive, and getting more expensive all the time. Do you realize how much money you are spending on cigarettes per year? What is the average amount you spend on a pack of cigarettes? What is the average number of packs that you smoke per day? Using your answers to these questions, fill in this formula to help you find out:  ($ _____ per pack) ×  ( _____ number of packs per day) × (365 days) =  $ _____ yearly cost of smoking  Besides tobacco, there are other costs, including extra cleaning bills and replacement costs for clothing and furniture; medical expenses for smoking-related illnesses; and higher health, life, and car insurance premiums.    Cigars and Pipes Count Too!  Cigars and pipes are also dangerous. So are smokeless (chewing) tobacco and snuff. All of these products contain nicotine, a highly addictive substance that has harmful effects on your body. Quitting smoking means giving up all tobacco products.       "3502-3508 Randi [facility], [address] [place] [address], [place] [address]. All rights reserved. This information is not intended as a substitute for professional medical care. Always follow your healthcare professional's instructions.          Follow-ups after your visit        Follow-up notes from your care team     Return in about 4 weeks (around 6/12/2018) for recheck shortness of breath.      Future tests that were ordered for you today     Open Future Orders        Priority Expected Expires Ordered    Pulmonary Function Test Routine  5/15/2019 5/15/2018    Blood gas arterial and oxyhgb (GH) Routine  5/15/2019 5/15/2018            Who to contact     If you have questions or need follow up information about today's clinic visit or your schedule please contact [facility] directly at [phone].  Normal or non-critical lab and imaging results will be communicated to you by MyChart, letter or phone within 4 business days after the clinic has received the results. If you do not hear from us within 7 days, please contact the clinic through MyChart or phone. If you have a critical or abnormal lab result, we will notify you by phone as soon as possible.  Submit refill requests through [employer] or call your pharmacy and they will forward the refill request to us. Please allow 3 business days for your refill to be completed.          Additional Information About Your Visit        [email or web address] Information     [employer] lets you send messages to your doctor, view your test results, renew your prescriptions, schedule appointments and more. To sign up, go to [email or web address] . Click on \"Log in\" on the left side of the screen, which will take you to the Welcome page. Then click on \"Sign up Now\" on the right side of the page.     You will be asked to enter the access code listed below, as well as some personal information. Please follow the directions to create your username and password.     Your access code is: " "[email or web address]  Expires: 2018  2:40 PM     Your access code will  in 90 days. If you need help or a new code, please call your [place] clinic or [phone].        Care EveryWhere ID     This is your Care EveryWhere ID. This could be used by other organizations to access your [place] medical records  [id number]        Your Vitals Were     Pulse Respirations Height Pulse Oximetry Breastfeeding? BMI (Body Mass Index)    91 19 5' 5\" (1.651 m) 95% No 33.95 kg/m2       Blood Pressure from Last 3 Encounters:   05/15/18 130/78   18 145/79   18 108/68    Weight from Last 3 Encounters:   05/15/18 204 lb (92.5 kg)   18 204 lb (92.5 kg)   17 210 lb (95.3 kg)              We Performed the Following     Tobacco Cessation - Order to Satisfy Health Maintenance          Today's Medication Changes          These changes are accurate as of 5/15/18 11:40 AM.  If you have any questions, ask your nurse or doctor.               Start taking these medicines.        Dose/Directions    * albuterol 108 (90 Base) MCG/ACT Inhaler   Commonly known as:  PROAIR HFA/PROVENTIL HFA/VENTOLIN HFA   Used for:  Shortness of breath   Started by:  Yanna Epperson MD        Dose:  2 puff   Inhale 2 puffs into the lungs every 6 hours as needed for shortness of breath / dyspnea or wheezing   Quantity:  1 Inhaler   Refills:  1       * albuterol (2.5 MG/3ML) 0.083% neb solution   Used for:  Shortness of breath   Started by:  Yanna Epperson MD        Dose:  1 vial   Take 1 vial (2.5 mg) by nebulization every 6 hours as needed for shortness of breath / dyspnea or wheezing   Quantity:  50 vial   Refills:  3       fluticasone 220 MCG/ACT Inhaler   Commonly known as:  FLOVENT HFA   Used for:  Shortness of breath   Started by:  Yanna Epperson MD        Dose:  2 puff   Inhale 2 puffs into the lungs 2 times daily   Quantity:  1 Inhaler   Refills:  1       * Notice:  This list has 2 medication(s) that are the same as other " medications prescribed for you. Read the directions carefully, and ask your doctor or other care provider to review them with you.         Where to get your medicines      These medications were sent to [id number] - [place], [place] - [address]   [address], [facility] [address]     Phone:  [phone]     albuterol (2.5 MG/3ML) 0.083% neb solution    albuterol 108 (90 Base) MCG/ACT Inhaler    fluticasone 220 MCG/ACT Inhaler                Primary Care Provider Office Phone # Fax #    Yanna Epperson -[phone] [address] [facility] [address]        Equal Access to Services     CHASE RUSSELL : Hadjennifer Odell, wamonroe lopez, qaybestelle kaalmada karen, tiana morales. So [facility] [phone].    ATENCIÓN: Si habla español, tiene a oquendo disposición servicios gratuitos de asistencia lingüística. [facility] [phone].    We comply with applicable federal civil rights laws and [place] laws. We do not discriminate on the basis of race, color, national origin, age, disability, sex, sexual orientation, or gender identity.            Thank you!     Thank you for choosing [facility]  for your care. Our goal is always to provide you with excellent care. Hearing back from our patients is one way we can continue to improve our services. Please take a few minutes to complete the written survey that you may receive in the mail after your visit with us. Thank you!             Your Updated Medication List - Protect others around you: Learn how to safely use, store and throw away your medicines at www.disposemymeds.org.          This list is accurate as of 5/15/18 11:40 AM.  Always use your most recent med list.                   Brand Name Dispense Instructions for use Diagnosis    * albuterol 108 (90 Base) MCG/ACT Inhaler    PROAIR HFA/PROVENTIL HFA/VENTOLIN HFA    1 Inhaler    Inhale 2 puffs into the lungs every 6 hours as needed for  shortness of breath / dyspnea or wheezing    Shortness of breath       * albuterol (2.5 MG/3ML) 0.083% neb solution     50 vial    Take 1 vial (2.5 mg) by nebulization every 6 hours as needed for shortness of breath / dyspnea or wheezing    Shortness of breath       calcium-vitamin D 600-400 MG-UNIT per tablet    CALTRATE     Take 1 tablet by mouth 2 times daily        fluticasone 220 MCG/ACT Inhaler    FLOVENT HFA    1 Inhaler    Inhale 2 puffs into the lungs 2 times daily    Shortness of breath       naproxen 500 MG tablet    NAPROSYN    60 tablet    Take 1 tablet (500 mg) by mouth 2 times daily (with meals)    Osteoporotic compression fracture of spine, with routine healing, subsequent encounter       OMEPRAZOLE PO      Take 20 mg by mouth every morning        * Notice:  This list has 2 medication(s) that are the same as other medications prescribed for you. Read the directions carefully, and ask your doctor or other care provider to review them with you.

## 2018-05-15 NOTE — PROGRESS NOTES
SUBJECTIVE:   Corinne S Eden is a 62 year old female who presents to clinic today for the following health issues:      ED/UC Followup:    Facility:  [facility]  Date of visit: 18  Reason for visit: pharyngitis   Current Status: stable     This started  with right ear pain and progressive throat pain and worsened over the time she was on a cruise. She was seen in the ER  and was treated with decadron which relieved her symptoms however she has remained short of breath with activity. This has been increasing insidiously over the last few month. Cough and throat pain has improved after starting OTC prevacid as well. She has been smoking since the age of 22, 1 PPD, 40 pack year history. She is thinking about quitting but isn't quite ready yet        Problem list and histories reviewed & adjusted, as indicated.  Additional history: as documented    Patient Active Problem List   Diagnosis     Closed fracture of ankle     Tobacco abuse counseling     Closed compression fracture of L4 lumbar vertebra with routine healing, subsequent encounter     LPRD (laryngopharyngeal reflux disease)     Tobacco abuse     Past Surgical History:   Procedure Laterality Date     ------------OTHER-------------      remove polyps from vocal cords      SECTION      son     FRACTURE TX, ANKLE RT/LT Right     surgery x 2       Social History   Substance Use Topics     Smoking status: Current Every Day Smoker     Packs/day: 1.00     Years: 30.00     Smokeless tobacco: Never Used     Alcohol use Yes      Comment: rarely     Family History   Problem Relation Age of Onset     Coronary Artery Disease Mother 73     Myocardial Infarction Mother      DIABETES Brother      Lung Cancer Father 66     DIABETES Maternal Grandmother      Hypertension No family hx of      Hyperlipidemia No family hx of      Breast Cancer No family hx of      Colon Cancer No family hx of      Asthma No family hx of      Thyroid Disease No family hx of   "        Current Outpatient Prescriptions   Medication Sig Dispense Refill     albuterol (2.5 MG/3ML) 0.083% neb solution Take 1 vial (2.5 mg) by nebulization every 6 hours as needed for shortness of breath / dyspnea or wheezing 50 vial 3     albuterol (PROAIR HFA/PROVENTIL HFA/VENTOLIN HFA) 108 (90 Base) MCG/ACT Inhaler Inhale 2 puffs into the lungs every 6 hours as needed for shortness of breath / dyspnea or wheezing 1 Inhaler 1     calcium-vitamin D (CALTRATE) 600-400 MG-UNIT per tablet Take 1 tablet by mouth 2 times daily       fluticasone (FLOVENT HFA) 220 MCG/ACT Inhaler Inhale 2 puffs into the lungs 2 times daily 1 Inhaler 1     naproxen (NAPROSYN) 500 MG tablet Take 1 tablet (500 mg) by mouth 2 times daily (with meals) 60 tablet 1     OMEPRAZOLE PO Take 20 mg by mouth every morning       Allergies   Allergen Reactions     Penicillins      BP Readings from Last 3 Encounters:   05/15/18 130/78   04/28/18 145/79   04/17/18 108/68    Wt Readings from Last 3 Encounters:   05/15/18 204 lb (92.5 kg)   04/17/18 204 lb (92.5 kg)   12/19/17 210 lb (95.3 kg)                    Reviewed and updated as needed this visit by clinical staff  Tobacco  Allergies  Meds  Med Hx  Surg Hx  Fam Hx  Soc Hx      Reviewed and updated as needed this visit by Provider         ROS:  Constitutional, HEENT, cardiovascular, pulmonary, gi and gu systems are negative, except as otherwise noted.    OBJECTIVE:                                                    /78  Pulse 91  Resp 19  Ht 5' 5\" (1.651 m)  Wt 204 lb (92.5 kg)  SpO2 95%  Breastfeeding? No  BMI 33.95 kg/m2  Body mass index is 33.95 kg/(m^2).  GENERAL APPEARANCE: healthy, alert, no distress and raspy voice  EYES: Eyes grossly normal to inspection and conjunctivae and sclerae normal  HENT: ear canals and TM's normal and nose and mouth without ulcers or lesions  NECK: no adenopathy, no asymmetry, masses, or scars and thyroid normal to palpation  RESP: lungs clear to " auscultation - no rales, rhonchi or wheezes and expiratory wheezes R lower posterior  CV: regular rates and rhythm, normal S1 S2, no S3 or S4 and no murmur, click or rub  NEURO: Normal strength and tone, mentation intact and speech normal  PSYCH: mentation appears normal and affect normal/bright         ASSESSMENT/PLAN:                                                    1. Shortness of breath  Will add albuterol HFA and neb solution, her brother has a neb machine at home. Will start flowvent after her PFTs. She most likely has COPD. Recheck in 4 weeks after testing  - albuterol (PROAIR HFA/PROVENTIL HFA/VENTOLIN HFA) 108 (90 Base) MCG/ACT Inhaler; Inhale 2 puffs into the lungs every 6 hours as needed for shortness of breath / dyspnea or wheezing  Dispense: 1 Inhaler; Refill: 1  - albuterol (2.5 MG/3ML) 0.083% neb solution; Take 1 vial (2.5 mg) by nebulization every 6 hours as needed for shortness of breath / dyspnea or wheezing  Dispense: 50 vial; Refill: 3  - fluticasone (FLOVENT HFA) 220 MCG/ACT Inhaler; Inhale 2 puffs into the lungs 2 times daily  Dispense: 1 Inhaler; Refill: 1  - General PFT Lab (Please always keep checked); Future  - Pulmonary Function Test; Future  - Blood gas arterial and oxyhgb (GH); Future  - General PFT Lab (Please always keep checked)  - XR CHEST 2 VW (Clinic Performed); Future    2. Tobacco abuse  1 PPD  - Tobacco Cessation - Order to Satisfy Health Maintenance    3. Tobacco abuse counseling  Not ready to quit at this time  - Tobacco Cessation - Order to Satisfy Health Maintenance    4. Throat pain  Resolved, acute infection    5. LPRD (laryngopharyngeal reflux disease)  Improved with prevacid OTC and diet changes          Yanna Epperson MD  [facility]

## 2018-05-15 NOTE — TELEPHONE ENCOUNTER
2:34 PM    Reason for Call: Phone Call    Description: Pt called and states that she is returning a call to the nurse and would like another call when she can.    Was an appointment offered for this call? No  If yes : Appointment type              Date    Preferred method for responding to this message: Telephone Call  What is your phone number ?[phone]    If we cannot reach you directly, may we leave a detailed response at the number you provided? Yes    Can this message wait until your PCP/provider returns, if available today? No, PCP is out     Cindy Devlin

## 2018-05-15 NOTE — NURSING NOTE
"Chief Complaint   Patient presents with     Urgent Care       Initial /78  Pulse 91  Resp 19  Ht 5' 5\" (1.651 m)  Wt 204 lb (92.5 kg)  SpO2 95%  Breastfeeding? No  BMI 33.95 kg/m2 Estimated body mass index is 33.95 kg/(m^2) as calculated from the following:    Height as of this encounter: 5' 5\" (1.651 m).    Weight as of this encounter: 204 lb (92.5 kg).  Medication Reconciliation: complete    Sharon Méndez LPN    "

## 2018-05-16 ASSESSMENT — PATIENT HEALTH QUESTIONNAIRE - PHQ9: SUM OF ALL RESPONSES TO PHQ QUESTIONS 1-9: 2

## 2018-05-16 ASSESSMENT — ANXIETY QUESTIONNAIRES: GAD7 TOTAL SCORE: 0

## 2018-06-06 ENCOUNTER — HOSPITAL ENCOUNTER (OUTPATIENT)
Dept: RESPIRATORY THERAPY | Facility: HOSPITAL | Age: 63
Discharge: HOME | End: 2018-06-06
Attending: FAMILY MEDICINE | Admitting: FAMILY MEDICINE
Payer: COMMERCIAL

## 2018-06-06 DIAGNOSIS — R06.02 SHORTNESS OF BREATH: ICD-10-CM

## 2018-06-06 LAB
COHGB MFR BLD: 6.9 % (ref 0–2)
HGB BLD-MCNC: 15.5 G/DL (ref 11.7–15.7)

## 2018-06-06 PROCEDURE — 94729 DIFFUSING CAPACITY: CPT

## 2018-06-06 PROCEDURE — 94726 PLETHYSMOGRAPHY LUNG VOLUMES: CPT

## 2018-06-06 PROCEDURE — 94060 EVALUATION OF WHEEZING: CPT | Mod: 26 | Performed by: INTERNAL MEDICINE

## 2018-06-06 PROCEDURE — 94729 DIFFUSING CAPACITY: CPT | Mod: 26 | Performed by: INTERNAL MEDICINE

## 2018-06-06 PROCEDURE — 82375 ASSAY CARBOXYHB QUANT: CPT | Performed by: FAMILY MEDICINE

## 2018-06-06 PROCEDURE — 94060 EVALUATION OF WHEEZING: CPT

## 2018-06-06 PROCEDURE — 25000125 ZZHC RX 250: Performed by: FAMILY MEDICINE

## 2018-06-06 PROCEDURE — 94726 PLETHYSMOGRAPHY LUNG VOLUMES: CPT | Mod: 26 | Performed by: INTERNAL MEDICINE

## 2018-06-06 PROCEDURE — 36415 COLL VENOUS BLD VENIPUNCTURE: CPT | Performed by: FAMILY MEDICINE

## 2018-06-06 PROCEDURE — 85018 HEMOGLOBIN: CPT | Performed by: FAMILY MEDICINE

## 2018-06-06 RX ORDER — ALBUTEROL SULFATE 0.83 MG/ML
2.5 SOLUTION RESPIRATORY (INHALATION)
Status: COMPLETED | OUTPATIENT
Start: 2018-06-06 | End: 2018-06-06

## 2018-06-06 RX ADMIN — ALBUTEROL SULFATE 2.5 MG: 2.5 SOLUTION RESPIRATORY (INHALATION) at 10:02

## 2018-06-26 ENCOUNTER — OFFICE VISIT (OUTPATIENT)
Dept: FAMILY MEDICINE | Facility: OTHER | Age: 63
End: 2018-06-26
Attending: FAMILY MEDICINE
Payer: COMMERCIAL

## 2018-06-26 VITALS
HEART RATE: 84 BPM | HEIGHT: 65 IN | WEIGHT: 204 LBS | OXYGEN SATURATION: 96 % | SYSTOLIC BLOOD PRESSURE: 128 MMHG | BODY MASS INDEX: 33.99 KG/M2 | RESPIRATION RATE: 19 BRPM | DIASTOLIC BLOOD PRESSURE: 80 MMHG

## 2018-06-26 DIAGNOSIS — Z71.6 TOBACCO ABUSE COUNSELING: ICD-10-CM

## 2018-06-26 DIAGNOSIS — Z23 IMMUNIZATION DUE: ICD-10-CM

## 2018-06-26 DIAGNOSIS — Z72.0 TOBACCO ABUSE: ICD-10-CM

## 2018-06-26 DIAGNOSIS — Z12.11 SPECIAL SCREENING FOR MALIGNANT NEOPLASMS, COLON: ICD-10-CM

## 2018-06-26 DIAGNOSIS — J43.2 CENTRILOBULAR EMPHYSEMA (H): Primary | ICD-10-CM

## 2018-06-26 PROCEDURE — 99213 OFFICE O/P EST LOW 20 MIN: CPT | Performed by: FAMILY MEDICINE

## 2018-06-26 ASSESSMENT — ANXIETY QUESTIONNAIRES
GAD7 TOTAL SCORE: 0
5. BEING SO RESTLESS THAT IT IS HARD TO SIT STILL: NOT AT ALL
IF YOU CHECKED OFF ANY PROBLEMS ON THIS QUESTIONNAIRE, HOW DIFFICULT HAVE THESE PROBLEMS MADE IT FOR YOU TO DO YOUR WORK, TAKE CARE OF THINGS AT HOME, OR GET ALONG WITH OTHER PEOPLE: NOT DIFFICULT AT ALL
6. BECOMING EASILY ANNOYED OR IRRITABLE: NOT AT ALL
2. NOT BEING ABLE TO STOP OR CONTROL WORRYING: NOT AT ALL
4. TROUBLE RELAXING: NOT AT ALL
1. FEELING NERVOUS, ANXIOUS, OR ON EDGE: NOT AT ALL
7. FEELING AFRAID AS IF SOMETHING AWFUL MIGHT HAPPEN: NOT AT ALL
3. WORRYING TOO MUCH ABOUT DIFFERENT THINGS: NOT AT ALL

## 2018-06-26 ASSESSMENT — PAIN SCALES - GENERAL: PAINLEVEL: NO PAIN (0)

## 2018-06-26 NOTE — MR AVS SNAPSHOT
"              After Visit Summary   6/26/2018    Corinne S Eden    MRN: [id number]           Patient Information     Date Of Birth          1955        Visit Information        Provider Department      6/26/2018 2:15 PM Yanna Epperson MD [facility]        Today's Diagnoses     Centrilobular emphysema (H)    -  1    Tobacco abuse        Tobacco abuse counseling           Follow-ups after your visit        Who to contact     If you have questions or need follow up information about today's clinic visit or your schedule please [facility] at [phone].  Normal or non-critical lab and imaging results will be communicated to you by MyChart, letter or phone within 4 business days after the clinic has received the results. If you do not hear from us within 7 days, please contact the clinic through MyChart or phone. If you have a critical or abnormal lab result, we will notify you by phone as soon as possible.  Submit refill requests through [employer] or call your pharmacy and they will forward the refill request to us. Please allow 3 business days for your refill to be completed.          Additional Information About Your Visit        Care EveryWhere ID     This is your Care EveryWhere ID. This could be used by other organizations to access your [place] medical records  [id number]        Your Vitals Were     Pulse Respirations Height Pulse Oximetry Breastfeeding? BMI (Body Mass Index)    84 19 5' 5\" (1.651 m) 96% No 33.95 kg/m2       Blood Pressure from Last 3 Encounters:   06/26/18 128/80   05/15/18 130/78   04/28/18 145/79    Weight from Last 3 Encounters:   06/26/18 204 lb (92.5 kg)   05/15/18 204 lb (92.5 kg)   04/17/18 204 lb (92.5 kg)              Today, you had the following     No orders found for display       Primary Care Provider Office Phone # Fax #    Yanna Epperson [phone]       [facility] [address]        Equal " Access to Services     [facility]: Hadii evelia colon edison Odell, waankitada luqadaha, qaybta kaalmatobias trippangel luistiana colladofelipemichael morales. So [facility] [phone].    ATENCIÓN: Si habla rai, tiene a oquendo disposición servicios gratuitos de asistencia lingüística. Llame al [phone].    We comply with applicable federal civil rights laws and [place] laws. We do not discriminate on the basis of race, color, national origin, age, disability, sex, sexual orientation, or gender identity.            Thank you!     Thank you for choosing [facility]  for your care. Our goal is always to provide you with excellent care. Hearing back from our patients is one way we can continue to improve our services. Please take a few minutes to complete the written survey that you may receive in the mail after your visit with us. Thank you!             Your Updated Medication List - Protect others around you: Learn how to safely use, store and throw away your medicines at www.disposemymeds.org.          This list is accurate as of 6/26/18  2:57 PM.  Always use your most recent med list.                   Brand Name Dispense Instructions for use Diagnosis    * albuterol 108 (90 Base) MCG/ACT Inhaler    PROAIR HFA/PROVENTIL HFA/VENTOLIN HFA    1 Inhaler    Inhale 2 puffs into the lungs every 6 hours as needed for shortness of breath / dyspnea or wheezing    Shortness of breath       * albuterol (2.5 MG/3ML) 0.083% neb solution     50 vial    Take 1 vial (2.5 mg) by nebulization every 6 hours as needed for shortness of breath / dyspnea or wheezing    Shortness of breath       calcium-vitamin D 600-400 MG-UNIT per tablet    CALTRATE     Take 1 tablet by mouth 2 times daily        fluticasone 220 MCG/ACT Inhaler    FLOVENT HFA    1 Inhaler    Inhale 2 puffs into the lungs 2 times daily    Shortness of breath       naproxen 500 MG tablet    NAPROSYN    60 tablet    Take 1 tablet (500 mg) by mouth 2 times daily  (with meals)    Osteoporotic compression fracture of spine, with routine healing, subsequent encounter       OMEPRAZOLE PO      Take 20 mg by mouth every morning        * Notice:  This list has 2 medication(s) that are the same as other medications prescribed for you. Read the directions carefully, and ask your doctor or other care provider to review them with you.

## 2018-06-26 NOTE — PROGRESS NOTES
SUBJECTIVE:   Corinne S Eden is a 62 year old female who presents to clinic today for the following health issues:      Results of PFT's      Duration: 2 months    Description (location/character/radiation): wants to go over PFT results and recommendations    Intensity:  mild    Accompanying signs and symptoms: none    History (similar episodes/previous evaluation): None    Precipitating or alleviating factors: None    Therapies tried and outcome: None     She is using her nebulizer first thing in the morning which is helpful. She hasn't started Flovent yet. She continues to smoke about a PPD. PFTs reveal FEV1/FVC of 59, 74% predicted, She has improvement with use of bronchodilators. Report was read as severe COPD with mild diffusion defect, restriction probable with a reversible componant      Problem list and histories reviewed & adjusted, as indicated.  Additional history: as documented    Patient Active Problem List   Diagnosis     Closed fracture of ankle     Tobacco abuse counseling     Closed compression fracture of L4 lumbar vertebra with routine healing, subsequent encounter     LPRD (laryngopharyngeal reflux disease)     Tobacco abuse     Past Surgical History:   Procedure Laterality Date     ------------OTHER-------------      remove polyps from vocal cords      SECTION      son     FRACTURE TX, ANKLE RT/LT Right 2005    surgery x 2       Social History   Substance Use Topics     Smoking status: Current Every Day Smoker     Packs/day: 1.00     Years: 30.00     Smokeless tobacco: Never Used     Alcohol use Yes      Comment: rarely     Family History   Problem Relation Age of Onset     Coronary Artery Disease Mother 73     Myocardial Infarction Mother      Diabetes Brother      Lung Cancer Father 66     Diabetes Maternal Grandmother      Hypertension No family hx of      Hyperlipidemia No family hx of      Breast Cancer No family hx of      Colon Cancer No family hx of      Asthma No family hx  "of      Thyroid Disease No family hx of          Current Outpatient Prescriptions   Medication Sig Dispense Refill     albuterol (2.5 MG/3ML) 0.083% neb solution Take 1 vial (2.5 mg) by nebulization every 6 hours as needed for shortness of breath / dyspnea or wheezing 50 vial 3     albuterol (PROAIR HFA/PROVENTIL HFA/VENTOLIN HFA) 108 (90 Base) MCG/ACT Inhaler Inhale 2 puffs into the lungs every 6 hours as needed for shortness of breath / dyspnea or wheezing 1 Inhaler 1     naproxen (NAPROSYN) 500 MG tablet Take 1 tablet (500 mg) by mouth 2 times daily (with meals) 60 tablet 1     OMEPRAZOLE PO Take 20 mg by mouth every morning       calcium-vitamin D (CALTRATE) 600-400 MG-UNIT per tablet Take 1 tablet by mouth 2 times daily       fluticasone (FLOVENT HFA) 220 MCG/ACT Inhaler Inhale 2 puffs into the lungs 2 times daily (Patient not taking: Reported on 6/26/2018) 1 Inhaler 1     Allergies   Allergen Reactions     Penicillins      BP Readings from Last 3 Encounters:   06/26/18 128/80   05/15/18 130/78   04/28/18 145/79    Wt Readings from Last 3 Encounters:   06/26/18 204 lb (92.5 kg)   05/15/18 204 lb (92.5 kg)   04/17/18 204 lb (92.5 kg)                    Reviewed and updated as needed this visit by clinical staff  Tobacco  Allergies  Meds  Med Hx  Surg Hx  Fam Hx  Soc Hx      Reviewed and updated as needed this visit by Provider         ROS:  Constitutional, HEENT, cardiovascular, pulmonary, gi and gu systems are negative, except as otherwise noted.    OBJECTIVE:                                                    /80  Pulse 84  Resp 19  Ht 5' 5\" (1.651 m)  Wt 204 lb (92.5 kg)  SpO2 96%  Breastfeeding? No  BMI 33.95 kg/m2  Body mass index is 33.95 kg/(m^2).  GENERAL APPEARANCE: healthy, alert and no distress  RESP: lungs clear to auscultation - no rales, rhonchi or wheezes  CV: regular rates and rhythm, normal S1 S2, no S3 or S4 and no murmur, click or rub  NEURO: Normal strength and tone, " mentation intact and speech normal  PSYCH: mentation appears normal and affect normal/bright    Results for orders placed or performed during the hospital encounter of 06/06/18   Carbon monoxide   Result Value Ref Range    Carbon Monoxide 6.9 (H) 0 - 2 %   Hemoglobin   Result Value Ref Range    Hemoglobin 15.5 11.7 - 15.7 g/dL          ASSESSMENT/PLAN:                                                    1. Centrilobular emphysema (H)  Discussed, she will start the Flovent now and continue the nebulizer. Follow up if not improving. She is still able to do much of what she wants. We discussed that this will progress and she will be on oxygen if she doesn't quit smoking, discussed options  - PNEUMOCOCCAL CONJ VACCINE 13 VALENT IM; Future  - ADMIN 1st VACCINE; Future    2. Tobacco abuse      3. Tobacco abuse counseling  As above, discussed patches, Chantix, gum as options, breaking habits to help quit. She is going to work on this    4. Special screening for malignant neoplasms, colon  She willing to do the occult screening  - Immunos occult blood; Future    5. Immunization due  Discussed pneumococcal vaccine she will think about it and schedule in the shot room          Yanna Epperson MD  [facility]

## 2018-06-26 NOTE — NURSING NOTE
"Chief Complaint   Patient presents with     Results       Initial /80  Pulse 84  Resp 19  Ht 5' 5\" (1.651 m)  Wt 204 lb (92.5 kg)  SpO2 96%  Breastfeeding? No  BMI 33.95 kg/m2 Estimated body mass index is 33.95 kg/(m^2) as calculated from the following:    Height as of this encounter: 5' 5\" (1.651 m).    Weight as of this encounter: 204 lb (92.5 kg).  Medication Reconciliation: complete    Sharon Méndez LPN    "

## 2018-06-27 ASSESSMENT — ANXIETY QUESTIONNAIRES: GAD7 TOTAL SCORE: 0

## 2018-06-27 ASSESSMENT — PATIENT HEALTH QUESTIONNAIRE - PHQ9: SUM OF ALL RESPONSES TO PHQ QUESTIONS 1-9: 3

## 2020-04-08 DIAGNOSIS — R06.02 SHORTNESS OF BREATH: ICD-10-CM

## 2020-04-08 RX ORDER — ALBUTEROL SULFATE 0.83 MG/ML
2.5 SOLUTION RESPIRATORY (INHALATION) EVERY 6 HOURS PRN
Qty: 50 VIAL | Refills: 3 | Status: SHIPPED | OUTPATIENT
Start: 2020-04-08 | End: 2020-11-30

## 2020-04-08 NOTE — TELEPHONE ENCOUNTER
albuterol (2.5 MG/3ML) 0.083% neb solution       Last Written Prescription Date:  05/15/18  Last Fill Quantity: 50,   # refills: vial3  Last Office Visit: 06/26/18  Future Office visit:

## 2020-04-08 NOTE — TELEPHONE ENCOUNTER
albuterol (PROVENTIL) (2.5 MG/3ML) 0.083% neb solution     Asthma Protocol Failed due to:     Recent (12 mo) or future (30 days) visit within the authorizing provider's specialty

## 2020-04-09 ENCOUNTER — TELEPHONE (OUTPATIENT)
Dept: FAMILY MEDICINE | Facility: OTHER | Age: 65
End: 2020-04-09

## 2020-07-24 ENCOUNTER — ANCILLARY PROCEDURE (OUTPATIENT)
Dept: GENERAL RADIOLOGY | Facility: OTHER | Age: 65
End: 2020-07-24
Attending: FAMILY MEDICINE
Payer: COMMERCIAL

## 2020-07-24 ENCOUNTER — OFFICE VISIT (OUTPATIENT)
Dept: FAMILY MEDICINE | Facility: OTHER | Age: 65
End: 2020-07-24
Attending: FAMILY MEDICINE
Payer: COMMERCIAL

## 2020-07-24 VITALS
BODY MASS INDEX: 36.92 KG/M2 | WEIGHT: 221.6 LBS | OXYGEN SATURATION: 98 % | RESPIRATION RATE: 16 BRPM | HEIGHT: 65 IN | DIASTOLIC BLOOD PRESSURE: 76 MMHG | HEART RATE: 87 BPM | TEMPERATURE: 98.4 F | SYSTOLIC BLOOD PRESSURE: 126 MMHG

## 2020-07-24 DIAGNOSIS — M54.41 ACUTE BILATERAL LOW BACK PAIN WITH RIGHT-SIDED SCIATICA: ICD-10-CM

## 2020-07-24 DIAGNOSIS — S32.040A COMPRESSION FRACTURE OF L4 VERTEBRA, INITIAL ENCOUNTER (H): ICD-10-CM

## 2020-07-24 DIAGNOSIS — Z12.31 OTHER SCREENING MAMMOGRAM: Primary | ICD-10-CM

## 2020-07-24 PROCEDURE — 99214 OFFICE O/P EST MOD 30 MIN: CPT | Performed by: FAMILY MEDICINE

## 2020-07-24 PROCEDURE — 72100 X-RAY EXAM L-S SPINE 2/3 VWS: CPT | Mod: TC

## 2020-07-24 ASSESSMENT — ANXIETY QUESTIONNAIRES
GAD7 TOTAL SCORE: 5
5. BEING SO RESTLESS THAT IT IS HARD TO SIT STILL: SEVERAL DAYS
1. FEELING NERVOUS, ANXIOUS, OR ON EDGE: SEVERAL DAYS
2. NOT BEING ABLE TO STOP OR CONTROL WORRYING: SEVERAL DAYS
6. BECOMING EASILY ANNOYED OR IRRITABLE: NOT AT ALL
3. WORRYING TOO MUCH ABOUT DIFFERENT THINGS: NOT AT ALL
4. TROUBLE RELAXING: MORE THAN HALF THE DAYS
7. FEELING AFRAID AS IF SOMETHING AWFUL MIGHT HAPPEN: NOT AT ALL
IF YOU CHECKED OFF ANY PROBLEMS ON THIS QUESTIONNAIRE, HOW DIFFICULT HAVE THESE PROBLEMS MADE IT FOR YOU TO DO YOUR WORK, TAKE CARE OF THINGS AT HOME, OR GET ALONG WITH OTHER PEOPLE: VERY DIFFICULT

## 2020-07-24 ASSESSMENT — PATIENT HEALTH QUESTIONNAIRE - PHQ9: SUM OF ALL RESPONSES TO PHQ QUESTIONS 1-9: 10

## 2020-07-24 ASSESSMENT — PAIN SCALES - GENERAL: PAINLEVEL: SEVERE PAIN (6)

## 2020-07-24 ASSESSMENT — MIFFLIN-ST. JEOR: SCORE: 1566.05

## 2020-07-24 NOTE — PROGRESS NOTES
Subjective     Corinne S Eden is a 64 year old female who presents to clinic today for the following health issues:    HPI       Back Pain       Duration: 2 months        Specific cause: lifting, turning/bending    Description:   Location of pain: low back bilateral  Character of pain: gnawing, cramping and constant  Pain radiation:none  New numbness or weakness in legs, not attributed to pain:  no     Intensity: Currently 6/10    History:   Pain interferes with job: No  History of back problems: no prior back problems  Any previous MRI or X-rays: None  Sees a specialist for back pain:  No  Therapies tried without relief: chiropractor, cold, heat, NSAIDs, Physical Therapy and stretch    Alleviating factors:   Improved by: nothing      Precipitating factors:  Worsened by: Lifting, Bending, Standing and Walking          Accompanying Signs & Symptoms:  Risk of Fracture:  None  Risk of Cauda Equina:  None  Risk of Infection:  None  Risk of Cancer:  None  Risk of Ankylosing Spondylitis:  Onset at age <35, male, AND morning back stiffness. no     Patient Active Problem List   Diagnosis     Closed fracture of ankle     Tobacco abuse counseling     Compression fracture of L4 vertebra, initial encounter (H)     LPRD (laryngopharyngeal reflux disease)     Tobacco abuse     Acute bilateral low back pain with right-sided sciatica     Past Surgical History:   Procedure Laterality Date     ------------OTHER-------------      remove polyps from vocal cords      SECTION      son     FRACTURE TX, ANKLE RT/LT Right 2005    surgery x 2       Social History     Tobacco Use     Smoking status: Current Every Day Smoker     Packs/day: 1.00     Years: 30.00     Pack years: 30.00     Smokeless tobacco: Never Used   Substance Use Topics     Alcohol use: Yes     Comment: rarely     Family History   Problem Relation Age of Onset     Coronary Artery Disease Mother 73     Myocardial Infarction Mother      Diabetes Brother      Lung  "Cancer Father 66     Diabetes Maternal Grandmother      Hypertension No family hx of      Hyperlipidemia No family hx of      Breast Cancer No family hx of      Colon Cancer No family hx of      Asthma No family hx of      Thyroid Disease No family hx of          Current Outpatient Medications   Medication Sig Dispense Refill     albuterol (PROAIR HFA/PROVENTIL HFA/VENTOLIN HFA) 108 (90 Base) MCG/ACT Inhaler Inhale 2 puffs into the lungs every 6 hours as needed for shortness of breath / dyspnea or wheezing 1 Inhaler 1     albuterol (PROVENTIL) (2.5 MG/3ML) 0.083% neb solution Take 1 vial (2.5 mg) by nebulization every 6 hours as needed for shortness of breath / dyspnea or wheezing 50 vial 3     calcium-vitamin D (CALTRATE) 600-400 MG-UNIT per tablet Take 1 tablet by mouth 2 times daily       fluticasone (FLOVENT HFA) 220 MCG/ACT Inhaler Inhale 2 puffs into the lungs 2 times daily 1 Inhaler 1     Allergies   Allergen Reactions     Penicillins      BP Readings from Last 3 Encounters:   07/24/20 126/76   06/26/18 128/80   05/15/18 130/78    Wt Readings from Last 3 Encounters:   07/24/20 100.5 kg (221 lb 9.6 oz)   06/26/18 92.5 kg (204 lb)   05/15/18 92.5 kg (204 lb)                    Reviewed and updated as needed this visit by Provider         Review of Systems   Constitutional, HEENT, cardiovascular, pulmonary, gi and gu systems are negative, except as otherwise noted.      Objective    /76 (BP Location: Right arm, Patient Position: Sitting, Cuff Size: Adult Regular)   Pulse 87   Temp 98.4  F (36.9  C) (Tympanic)   Resp 16   Ht 1.651 m (5' 5\")   Wt 100.5 kg (221 lb 9.6 oz)   SpO2 98%   BMI 36.88 kg/m    There is no height or weight on file to calculate BMI.  Physical Exam   GENERAL: healthy, alert and no distress  NECK: no adenopathy, no asymmetry, masses, or scars and thyroid normal to palpation  RESP: lungs clear to auscultation - no rales, rhonchi or wheezes  CV: regular rate and rhythm, normal S1 " S2, no S3 or S4, no murmur, click or rub, no peripheral edema and peripheral pulses strong  ABDOMEN: soft, nontender, no hepatosplenomegaly, no masses and bowel sounds normal  MS: no gross musculoskeletal defects noted, no edema  PSYCH: mentation appears normal, affect normal/bright  Lumber/Thoracic Spine Exam: Tender:  lumbar facet joints, left SI joint, right SI joint, left sciatic notch, right sciatic notch  Non-tender:  left parathoracic muscles, right parathoracic muscles, left para lumbar muscles, right para lumbar muscles  Range of Motion:  lumbar flexion  decreased, lumbar extension  decreased, left lateral lumbar bending  decreased, right lateral lumbar bending  decreased, left lateral lumbar rotation  decreased, right lateral lumbar rotation  decreased  Strength:  able to heel walk, able to toe walk  Special tests:  positive right straight leg raise, positive SI joint compression, positive right femoral stretch test    Hip Exam: left greater trocanter tender, right greater trocanter tender        Diagnostic Test Results:  Labs reviewed in Epic  Results for orders placed or performed in visit on 07/24/20   XR LUMBAR SPINE 2/3 VIEWS (Clinic Performed)     Status: None    Narrative    PROCEDURE: XR LUMBAR SPINE 2-3 VIEWS 7/24/2020 11:56 AM    HISTORY: Acute bilateral low back pain with right-sided sciatica    COMPARISONS: 2017    TECHNIQUE: AP and lateral    FINDINGS: There is an L4 compression fracture which is new as compared  to 2017. Mild anterior osteophytes are seen throughout the lumbar  region. Lumbar facet joint degenerative changes are noted most severe  at L3-L4 L4-L5 and L5-S1 bilaterally.         Impression    IMPRESSION: L4 compression fracture new from 2017    JOE HO MD           Assessment & Plan     (Z12.31) Other screening mammogram  (primary encounter diagnosis)  Comment:   Plan: MA SCREENING DIGITAL BILATERAL ([place])          (M54.41) Acute bilateral low back pain with  "right-sided sciatica  Comment:   Plan: XR LUMBAR SPINE 2/3 VIEWS (Clinic Performed),         PHYSICAL THERAPY REFERRAL          (S32.040A) Compression fracture of L4 vertebra, initial encounter (H)  Comment:   Plan: PHYSICAL THERAPY REFERRAL        Discussed osteoporosis, may need dexa scan and further work up  Recommend smoking cessaation  NSAIDs and PT  Follow-up with primary     Tobacco Cessation:   reports that she has been smoking. She has a 30.00 pack-year smoking history. She has never used smokeless tobacco.        BMI:   Estimated body mass index is 36.88 kg/m  as calculated from the following:    Height as of this encounter: 1.651 m (5' 5\").    Weight as of this encounter: 100.5 kg (221 lb 9.6 oz).   Weight management plan: Discussed healthy diet and exercise guidelines      Patient was agreeable to this plan and had no further questions.  There are no Patient Instructions on file for this visit.    No follow-ups on file.    Angie Weiss MD  [facility] [place]  "

## 2020-07-25 ASSESSMENT — ANXIETY QUESTIONNAIRES: GAD7 TOTAL SCORE: 5

## 2020-07-27 ENCOUNTER — HOSPITAL ENCOUNTER (OUTPATIENT)
Dept: PHYSICAL THERAPY | Facility: HOSPITAL | Age: 65
Setting detail: THERAPIES SERIES
End: 2020-07-27
Attending: FAMILY MEDICINE
Payer: COMMERCIAL

## 2020-07-27 DIAGNOSIS — S32.040A COMPRESSION FRACTURE OF L4 VERTEBRA, INITIAL ENCOUNTER (H): ICD-10-CM

## 2020-07-27 DIAGNOSIS — M54.41 ACUTE BILATERAL LOW BACK PAIN WITH RIGHT-SIDED SCIATICA: ICD-10-CM

## 2020-07-27 PROCEDURE — 97161 PT EVAL LOW COMPLEX 20 MIN: CPT | Mod: GP

## 2020-07-27 PROCEDURE — 97110 THERAPEUTIC EXERCISES: CPT | Mod: GP

## 2020-07-27 NOTE — PROGRESS NOTES
Initial Physical Therapy Evaluations       Name: Corinne S Eden MRN# [id number]   Age: 64 year old YOB: 1955     Date of Consultation: 2020  Primary care provider: Yanna Epperson    Referring Physician: Angie Weiss  Orders: Eval and Treat  Medical Diagnosis: Low Back Pain, Compression Fracture L4 Vertabra  Onset of Illness/Injury: 20 - since first week of      Reason for PT Visit: Patient presents w/ PT pain to physical therapy.  X-ray for low back showed compression fracture.  Prior to this, she visited the chiropractor who manipulated her twice, making the pain worse.  She was attempting sciatic exercises such as back extension and rotation, which were making symptoms worse.   Patient returns back to [facility] next Saturday, which she is fearful of exacerbating symptoms.    Prior Level of Function: Independent, very active     Community Support/Living Environment/Employment History: Patient works full time at [employer]     Patient/Family Goal: to have less back pain to continue working and perform house projects    Fall Screen:   Have you fallen 2 or more times in the last year? No  Have you fallen and had an injury in the last year? No  Timed up & go: n/a  Is patient a fall risk? No    Past Medical History:   No past medical history on file.    Past Surgical History:  Past Surgical History:   Procedure Laterality Date     ------------OTHER-------------      remove polyps from vocal cords      SECTION      son     FRACTURE TX, ANKLE RT/LT Right     surgery x 2       Medications:   Current Outpatient Medications   Medication Sig     albuterol (PROAIR HFA/PROVENTIL HFA/VENTOLIN HFA) 108 (90 Base) MCG/ACT Inhaler Inhale 2 puffs into the lungs every 6 hours as needed for shortness of breath / dyspnea or wheezing     albuterol (PROVENTIL) (2.5 MG/3ML) 0.083% neb solution Take 1 vial (2.5 mg) by nebulization every 6 hours as needed for shortness of breath / dyspnea or  Patient already scheduled for calcium scoring. She is aware refills were sent in.    wheezing     calcium-vitamin D (CALTRATE) 600-400 MG-UNIT per tablet Take 1 tablet by mouth 2 times daily     fluticasone (FLOVENT HFA) 220 MCG/ACT Inhaler Inhale 2 puffs into the lungs 2 times daily     No current facility-administered medications for this encounter.        Imaging:     Musculoskeletal Findings:   Patient goals:      OBJECTIVE  Pain at rest: 3/10  Pain with activity: 10/10  Aggravating Factors: standing, sitting, changing positions, rolling   Relieving Factors: laying down, rest, ibuprofen        Pain with coughing: No  Pain with sneezing: No  Pain with straining: No  Change in bowel/bladder: No  Numbness or tingling in groin area: No    Palpation: point tenderness L4 SP     Gait: Normal    Assistive devices:      Functional mobility   Difficulty transitioning sit <> stand and first 2-3 steps --> then able to ambulate normally      Posture: Normal erect.  Sitting Posture: good  Standing Posture: good  Correction of posture: same     Neurological Assessment:   Reflexes - Right:   Patellar: Normal   Achilles: Normal   Reflexes - Left:   Patellar: Normal   Achilles: Normal      Myotomes:   Right lower extremity:negative  Left lower extremity: negative     Dermatomes:   Right lower extremity: negative  Left lower extremity: negative     Range of Motion:  Active Lumbar Spine       Flexion: Hands to Knee requiring constant UE support due to pain       Extension: unable to tolerate any extension due to pain       Right sidebend:       Left sidebend:        Right rotation:        Left rotation:      Right Lower Extremity Range of Motion: within normal limits   Left Lower Extremity Range of Motion: within normal limits     Strength:  Abdominal Strength: 3/5  Right Lower Extremity Strength: 4/5 Limited by back pain  Left Lower Extremity Strength: 4/5 Limited by back pain     Special Tests:  Spine Special Tests:  Slump:  Left: Neg              Right: neg  Straight Leg Raise:           Left:   neg    Right:  neg  Repeated Movement Testing:    increased pain w/ flex, ext, and rotation, laying prone w/ 1 pillow under pelvis was relieving   Passive Intervertebral Accessory Movements:    not tested  Prone Instability Test:         L LE: WFLs     R LE: WFLs     Patient's Concordant Sign: Standing, Sitting, Extended activity, twisting, lifting         Outcome Measures:   Sebastián STarT Sub-Score (Q5-9): 4  Sebastián STarT Total Score (all 9): 7  Oswestry Score (%): 46.67 %     Goals:   1. Patient will be consistent and compliant in HEP.  2. Patient will be consistent in rest, and avoiding ext/rotation/lifting in order to give low back a chance to heal.   3. Patient will be able to bend over to pick objects up to 10#'s from the floor.  4. Patient will be able to sit or stand for 30 min consistently w/o increase in low back pain to perform work duties.       Planned Interventions: Therapeutic Exercise, Manual Therapy, Mechanical Traction, Dry Needling    Clinical Impressions:  Criteria for Skilled Therapeutic Intervention Met: yes  PT Diagnosis: Low Back Pain w/ L4 fracture   Influenced by the following impairments: pain, decreased trunk mobility, decreased trunk strength  Functional limitations due to impairment: bending, lifting, twisting, standing, walking, sitting  Clinical presentation: Stable/Uncomplicated  Clinical presentation rationale: therapist discretion  Clinical Decision making (complexity): Low Complexity  Predicted Duration of Therapy Intervention (days/wks): 1-2x  Risks and Benefits of therapy have been explained: Yes  Patient, Family & other staff in agreement with plan of care: Yes  Comments: Given patient HEP to work on over next 2 weeks as well as rest.  Pain at rest tolerable, will increase dosage of PT once mobility starts to improve and patient actually gives her low back a chance to rest.     Total Evaluation Time: 15

## 2020-08-06 ENCOUNTER — TELEPHONE (OUTPATIENT)
Dept: FAMILY MEDICINE | Facility: OTHER | Age: 65
End: 2020-08-06

## 2020-08-14 ENCOUNTER — TRANSFERRED RECORDS (OUTPATIENT)
Dept: HEALTH INFORMATION MANAGEMENT | Facility: CLINIC | Age: 65
End: 2020-08-14

## 2020-08-27 ENCOUNTER — VIRTUAL VISIT (OUTPATIENT)
Dept: FAMILY MEDICINE | Facility: OTHER | Age: 65
End: 2020-08-27
Attending: FAMILY MEDICINE
Payer: COMMERCIAL

## 2020-08-27 VITALS — BODY MASS INDEX: 36.65 KG/M2 | HEIGHT: 65 IN | WEIGHT: 220 LBS

## 2020-08-27 DIAGNOSIS — M54.50 LUMBAR PAIN: Primary | ICD-10-CM

## 2020-08-27 DIAGNOSIS — Z71.6 TOBACCO ABUSE COUNSELING: ICD-10-CM

## 2020-08-27 DIAGNOSIS — R06.02 SHORTNESS OF BREATH: ICD-10-CM

## 2020-08-27 DIAGNOSIS — F40.240 CLAUSTROPHOBIA: ICD-10-CM

## 2020-08-27 DIAGNOSIS — Z72.0 TOBACCO ABUSE: ICD-10-CM

## 2020-08-27 PROCEDURE — 99213 OFFICE O/P EST LOW 20 MIN: CPT | Mod: 95 | Performed by: FAMILY MEDICINE

## 2020-08-27 RX ORDER — DIAZEPAM 5 MG
TABLET ORAL
Qty: 1 TABLET | Refills: 0 | Status: SHIPPED | OUTPATIENT
Start: 2020-08-27 | End: 2021-03-18

## 2020-08-27 RX ORDER — ALBUTEROL SULFATE 90 UG/1
2 AEROSOL, METERED RESPIRATORY (INHALATION) EVERY 6 HOURS PRN
Qty: 1 INHALER | Refills: 1 | Status: SHIPPED | OUTPATIENT
Start: 2020-08-27 | End: 2021-12-01

## 2020-08-27 ASSESSMENT — PAIN SCALES - GENERAL: PAINLEVEL: NO PAIN (0)

## 2020-08-27 ASSESSMENT — MIFFLIN-ST. JEOR: SCORE: 1558.79

## 2020-08-27 NOTE — NURSING NOTE
"Chief Complaint   Patient presents with     Back Pain       Initial Ht 1.651 m (5' 5\")   Wt 99.8 kg (220 lb)   BMI 36.61 kg/m   Estimated body mass index is 36.61 kg/m  as calculated from the following:    Height as of this encounter: 1.651 m (5' 5\").    Weight as of this encounter: 99.8 kg (220 lb).  Medication Reconciliation: complete  Sharon Méndez LPN      "

## 2020-08-27 NOTE — PROGRESS NOTES
"Corinne S Eden is a 64 year old female who is being evaluated via a billable telephone visit.      The patient has been notified of following:     \"This telephone visit will be conducted via a call between you and your physician/provider. We have found that certain health care needs can be provided without the need for a physical exam.  This service lets us provide the care you need with a short phone conversation.  If a prescription is necessary we can send it directly to your pharmacy.  If lab work is needed we can place an order for that and you can then stop by our lab to have the test done at a later time.    Telephone visits are billed at different rates depending on your insurance coverage. During this emergency period, for some insurers they may be billed the same as an in-person visit.  Please reach out to your insurance provider with any questions.    If during the course of the call the physician/provider feels a telephone visit is not appropriate, you will not be charged for this service.\"    Patient has given verbal consent for Telephone visit?  Yes    What phone number would you like to be contacted at? [phone]    How would you like to obtain your AVS? Mail a copy    Subjective     Corinne S Eden is a 64 year old female who presents via phone visit today for the following health issues:    HPI    Back Pain  Hoping to have MRI done for this issue  Onset/Duration: ongoing for a few months  Description:   Location of pain: low back bilateral  Character of pain: gnawing, cramping and constant  Pain radiation: radiates into the right buttocks and radiates into the left buttocks  New numbness or weakness in legs, not attributed to pain: YES- just on her left leg to her toes  Intensity: Currently 2/10  Progression of Symptoms: worsening  History:   Specific cause: none  Pain interferes with job: no  History of back problems: no prior back problems  Any previous MRI or X-rays: Yes- at [place].  Date 7/24/20 " by Dr Weiss  Sees a specialist for back pain: Yes, Dr. Keys, contact made with the following plan: he repeated x ray and said to follow prn  Alleviating factors:   Improved by: none    Precipitating factors:  Worsened by: Lifting, Bending, Standing and Walking  Therapies tried and outcome: acetaminophen (Tylenol) and NSAIDs    Accompanying Signs & Symptoms:  Risk of Fracture: None  Risk of Cauda Equina: None  Risk of Infection: None  Risk of Cancer: None  Risk of Ankylosing Spondylitis: Onset at age <35, male, AND morning back stiffness  no       She was found to have an L4 compression fracture on x rays in June with Dr Weiss. The pain has continued since then, sometimes radiating down both legs, sometimes paresthesias of the left leg. She is using Tylenol and Ibuprofen. She missed one day at work, 8-1-2020, she doesn't want pain medications           Review of Systems   Constitutional, HEENT, cardiovascular, pulmonary, gi and gu systems are negative, except as otherwise noted.       Objective          Vitals:  No vitals were obtained today due to virtual visit.    healthy, alert and no distress  PSYCH: Alert and oriented times 3; coherent speech, normal   rate and volume, able to articulate logical thoughts, able   to abstract reason, no tangential thoughts, no hallucinations   or delusions  Her affect is normal  RESP: No cough, no audible wheezing, able to talk in full sentences  Remainder of exam unable to be completed due to telephone visits            Assessment/Plan:    Assessment & Plan     Lumbar pain  Will obtain lumbar MRI to evaluate further  - MR Lumbar Spine w/o Contrast; Future    Shortness of breath  Renewed   - albuterol (PROAIR HFA/PROVENTIL HFA/VENTOLIN HFA) 108 (90 Base) MCG/ACT inhaler; Inhale 2 puffs into the lungs every 6 hours as needed for shortness of breath / dyspnea or wheezing    Tobacco abuse      Tobacco abuse counseling  Not ready to quit     Claustrophobia  To take prior to MRI  -  diazepam (VALIUM) 5 MG tablet; Take one hour prior to procedure, do not drive while taking     Tobacco Cessation:   reports that she has been smoking. She has a 30.00 pack-year smoking history. She has never used smokeless tobacco.  Tobacco Cessation Action Plan: Information offered: Patient not interested at this time            Return if symptoms worsen or fail to improve.    Yanna Epperson MD  [facility] [place]    Phone call duration:  13 minutes

## 2020-08-31 ENCOUNTER — DOCUMENTATION ONLY (OUTPATIENT)
Dept: INTERVENTIONAL RADIOLOGY/VASCULAR | Facility: HOSPITAL | Age: 65
End: 2020-08-31

## 2020-08-31 ENCOUNTER — HOSPITAL ENCOUNTER (OUTPATIENT)
Dept: MAMMOGRAPHY | Facility: OTHER | Age: 65
End: 2020-08-31
Attending: FAMILY MEDICINE
Payer: COMMERCIAL

## 2020-08-31 ENCOUNTER — HOSPITAL ENCOUNTER (OUTPATIENT)
Dept: ULTRASOUND IMAGING | Facility: HOSPITAL | Age: 65
End: 2020-08-31
Attending: FAMILY MEDICINE
Payer: COMMERCIAL

## 2020-08-31 DIAGNOSIS — Z12.31 OTHER SCREENING MAMMOGRAM: ICD-10-CM

## 2020-08-31 DIAGNOSIS — R92.8 ABNORMAL MAMMOGRAM: ICD-10-CM

## 2020-08-31 PROCEDURE — 76642 ULTRASOUND BREAST LIMITED: CPT | Mod: TC,RT

## 2020-08-31 PROCEDURE — 77067 SCR MAMMO BI INCL CAD: CPT | Mod: TC

## 2020-08-31 PROCEDURE — 77065 DX MAMMO INCL CAD UNI: CPT | Mod: TC

## 2020-08-31 PROCEDURE — G0279 TOMOSYNTHESIS, MAMMO: HCPCS | Mod: TC

## 2020-08-31 NOTE — PROGRESS NOTES
"Pt was recommended that she have a breast biopsy and aspiration.  Pt wanted to wait 6 months and Dr. Dinh stated it could wait three months for follow up ultrasound.  Pt states she will call us to let us know what she decided and schedule something \"after she takes care of this darn back.\"    "

## 2020-09-02 ENCOUNTER — HOSPITAL ENCOUNTER (OUTPATIENT)
Dept: MRI IMAGING | Facility: HOSPITAL | Age: 65
Discharge: HOME | End: 2020-09-02
Attending: FAMILY MEDICINE | Admitting: FAMILY MEDICINE
Payer: COMMERCIAL

## 2020-09-02 DIAGNOSIS — M54.50 LUMBAR PAIN: ICD-10-CM

## 2020-09-02 PROCEDURE — 72148 MRI LUMBAR SPINE W/O DYE: CPT | Mod: TC

## 2020-09-08 ENCOUNTER — OFFICE VISIT (OUTPATIENT)
Dept: FAMILY MEDICINE | Facility: OTHER | Age: 65
End: 2020-09-08
Attending: FAMILY MEDICINE
Payer: COMMERCIAL

## 2020-09-08 VITALS
OXYGEN SATURATION: 96 % | WEIGHT: 232 LBS | BODY MASS INDEX: 38.61 KG/M2 | SYSTOLIC BLOOD PRESSURE: 152 MMHG | DIASTOLIC BLOOD PRESSURE: 82 MMHG | HEART RATE: 90 BPM

## 2020-09-08 DIAGNOSIS — Z71.6 TOBACCO ABUSE COUNSELING: ICD-10-CM

## 2020-09-08 DIAGNOSIS — Z12.11 SCREEN FOR COLON CANCER: ICD-10-CM

## 2020-09-08 DIAGNOSIS — M48.062 SPINAL STENOSIS OF LUMBAR REGION WITH NEUROGENIC CLAUDICATION: Primary | ICD-10-CM

## 2020-09-08 DIAGNOSIS — N63.0 BREAST MASS: ICD-10-CM

## 2020-09-08 DIAGNOSIS — Z72.0 TOBACCO USE: ICD-10-CM

## 2020-09-08 PROCEDURE — 99213 OFFICE O/P EST LOW 20 MIN: CPT | Performed by: FAMILY MEDICINE

## 2020-09-08 ASSESSMENT — PAIN SCALES - GENERAL: PAINLEVEL: MODERATE PAIN (4)

## 2020-09-08 NOTE — PROGRESS NOTES
Subjective     Corinne S Eden is a 64 year old female who presents to clinic today for the following health issues:    HPI       Concern - MRI results for back pain  Onset: couple weeks  Description: had MRI done [facility] [place]2/20. Pt requested in person appointment to discuss options  Intensity: 4/10 currently, took naproxen at Noon  Progression of Symptoms:  same  Accompanying Signs & Symptoms: none  Previous history of similar problem: no   Precipitating factors:        Worsened by: lifting, bending, standing and walking   Alleviating factors:        Improved by: Tylenol and NSAIDS, support wrap, ice  Therapies tried and outcome: medication, PT, exercises, support wrap, heat/ice        Breast mass, right  Breast mass of the right breast noted on mammogram and follow up ultrasound. Aspiration was recommended but she has a fear of needles and so has put this off. She states another recommendation was made that this could be re ultrasounded in 3 months ( though this wasn't noted in the radiology note) but she would prefer this    Review of Systems   Constitutional, HEENT, cardiovascular, pulmonary, gi and gu systems are negative, except as otherwise noted.      Objective    BP (!) 156/94 (Patient Position: Sitting)   Pulse 90   Wt 105.2 kg (232 lb)   SpO2 96%   BMI 38.61 kg/m    Body mass index is 38.61 kg/m .  Physical Exam   GENERAL: healthy, alert and no distress  NEURO: Normal strength and tone, mentation intact and speech normal  PSYCH: mentation appears normal, affect normal/bright        Assessment & Plan     Spinal stenosis of lumbar region with neurogenic claudication  Discussed options including epidural injection verses neurosurgery consult. She prefers the consult. She manages with a pain level of 3-4 and will also need [facility] papers   - NEUROSURGERY REFERRAL    Breast mass  Right, she will get back to us when she is ready to proceed. Discussed that we can use valium to calm her prior to the procedure. She  would like to have a friend go back with her if possible for an aspiration but she will let us know when she wants to proceed     Tobacco use      Tobacco abuse counseling  Not ready to quit at this time     Screen for colon cancer  She is agreeable to have cologuard testing sent              Return if symptoms worsen or fail to improve.    Yanna Epperson MD  [facility]

## 2020-09-08 NOTE — NURSING NOTE
"Chief Complaint   Patient presents with     Results       Initial BP (!) 156/94 (Patient Position: Sitting)   Pulse 90   Wt 105.2 kg (232 lb)   SpO2 96%   BMI 38.61 kg/m   Estimated body mass index is 38.61 kg/m  as calculated from the following:    Height as of 8/27/20: 1.651 m (5' 5\").    Weight as of this encounter: 105.2 kg (232 lb).  Medication Reconciliation: complete  Lauren Ghosh LPN  "

## 2020-09-15 ENCOUNTER — TELEPHONE (OUTPATIENT)
Dept: FAMILY MEDICINE | Facility: OTHER | Age: 65
End: 2020-09-15

## 2020-09-15 NOTE — TELEPHONE ENCOUNTER
10:41 AM    Reason for Call: Xrays to [facility]     Description: Call from patient reporting she has a scheduled appt on 9/22/20 with provider at [facility].     MRI has been received by [facility]. Requesting X rays as well.     Please send xrays.     Was an appointment offered for this call? No  If yes : Appointment type              Date    Preferred method for responding to this message: Scan Xrays   What is your phone number ?    If we cannot reach you directly, may we leave a detailed response at the number you provided? Yes    Can this message wait until your PCP/provider returns, if available today? YES    Ying Curran RN

## 2020-09-29 ENCOUNTER — TRANSFERRED RECORDS (OUTPATIENT)
Dept: HEALTH INFORMATION MANAGEMENT | Facility: CLINIC | Age: 65
End: 2020-09-29

## 2020-09-29 ENCOUNTER — MEDICAL CORRESPONDENCE (OUTPATIENT)
Dept: HEALTH INFORMATION MANAGEMENT | Facility: HOSPITAL | Age: 65
End: 2020-09-29

## 2020-09-29 LAB — COLOGUARD-ABSTRACT: POSITIVE

## 2020-10-05 ENCOUNTER — HOSPITAL ENCOUNTER (OUTPATIENT)
Dept: GENERAL RADIOLOGY | Facility: HOSPITAL | Age: 65
Discharge: HOME | End: 2020-10-05
Attending: PHYSICIAN ASSISTANT | Admitting: PHYSICIAN ASSISTANT
Payer: COMMERCIAL

## 2020-10-05 ENCOUNTER — TELEPHONE (OUTPATIENT)
Dept: FAMILY MEDICINE | Facility: OTHER | Age: 65
End: 2020-10-05

## 2020-10-05 DIAGNOSIS — M48.062 SPINAL STENOSIS, LUMBAR REGION, WITH NEUROGENIC CLAUDICATION: ICD-10-CM

## 2020-10-05 DIAGNOSIS — S32.030A: ICD-10-CM

## 2020-10-05 PROCEDURE — 72100 X-RAY EXAM L-S SPINE 2/3 VWS: CPT

## 2020-10-05 NOTE — TELEPHONE ENCOUNTER
Called and notified patient of positive cologuard result. Pt states doesn't want referral at this time.

## 2020-10-05 NOTE — TELEPHONE ENCOUNTER
Please let patient know that cologuard testing came back positive and the next step to evaluate is a colonoscpy. I will send a referral for where ever she would like to have this done

## 2020-10-06 NOTE — TELEPHONE ENCOUNTER
Call from [facility] providing notification of positive cologuard, specimen collected on 9/29/20, ensuring Dr. Yanna Epperson has received the result. Notified Dr. MARLEEN Epperson has received and addressed.

## 2020-11-30 DIAGNOSIS — R06.02 SHORTNESS OF BREATH: ICD-10-CM

## 2020-11-30 RX ORDER — ALBUTEROL SULFATE 0.83 MG/ML
2.5 SOLUTION RESPIRATORY (INHALATION) EVERY 6 HOURS PRN
Qty: 50 VIAL | Refills: 3 | Status: SHIPPED | OUTPATIENT
Start: 2020-11-30 | End: 2021-06-30

## 2020-11-30 NOTE — TELEPHONE ENCOUNTER
albuterol (PROVENTIL) (2.5 MG/3ML) 0.083% neb solution        Last Written Prescription Date:  4/8/20  Last Fill Quantity: 50 vial,   # refills: 3  Last Office Visit: 9/8/20  Future Office visit:       Routing refill request to provider for review/approval because:

## 2020-12-01 ENCOUNTER — MEDICAL CORRESPONDENCE (OUTPATIENT)
Dept: HEALTH INFORMATION MANAGEMENT | Facility: HOSPITAL | Age: 65
End: 2020-12-01

## 2020-12-07 ENCOUNTER — HOSPITAL ENCOUNTER (OUTPATIENT)
Dept: GENERAL RADIOLOGY | Facility: HOSPITAL | Age: 65
Discharge: HOME | End: 2020-12-07
Attending: PHYSICIAN ASSISTANT | Admitting: PHYSICIAN ASSISTANT
Payer: COMMERCIAL

## 2020-12-07 DIAGNOSIS — S32.030A COMPRESSION FRACTURE OF L3 VERTEBRA (H): ICD-10-CM

## 2020-12-07 PROCEDURE — 72100 X-RAY EXAM L-S SPINE 2/3 VWS: CPT

## 2021-01-18 ENCOUNTER — TELEPHONE (OUTPATIENT)
Dept: FAMILY MEDICINE | Facility: OTHER | Age: 66
End: 2021-01-18

## 2021-01-18 NOTE — TELEPHONE ENCOUNTER
Called and notified patient of this information. She verbalized understanding. States she wears a mask when she enters the building and when she goes to break or bathroom her main concern is when she talks on the phone with her mask she gets even more winded. Wondering if the from could be completed to note only at her station and while on the phone to not have to wear her mask.

## 2021-01-18 NOTE — TELEPHONE ENCOUNTER
Ok to not wear mask only while at work station on telephone. Must wear mask elsewhere in the building

## 2021-01-18 NOTE — TELEPHONE ENCOUNTER
Called patient to notify her. Form completed, copy made for chart, faxed to number provided on the bottom of the form.

## 2021-01-18 NOTE — TELEPHONE ENCOUNTER
Patient calling and reports she has a form for her PCP to fill out for her work. Patient states she is going to drop this form off today. The form is faxed back to her work and fax number is on the form. States she is needing this due to not wearing her mask at her desk due to COPD. Patient states when she gets up to leave her desk she wears her mask.

## 2021-01-18 NOTE — TELEPHONE ENCOUNTER
Form from patients work place [employer] wants completed and faxed. Form is a mask exemption provider statement form. Please advise if willing to complete. Pt has COPD.

## 2021-01-18 NOTE — TELEPHONE ENCOUNTER
Form received [employer] Mask Exemption ,placed in provider's wall bin.   After form is completed patient would like form to be  Please fax and call pt so she knows it was done.

## 2021-01-28 ENCOUNTER — MEDICAL CORRESPONDENCE (OUTPATIENT)
Dept: HEALTH INFORMATION MANAGEMENT | Facility: HOSPITAL | Age: 66
End: 2021-01-28

## 2021-02-01 ENCOUNTER — HOSPITAL ENCOUNTER (EMERGENCY)
Facility: HOSPITAL | Age: 66
Discharge: HOME | End: 2021-02-01
Attending: EMERGENCY MEDICINE | Admitting: EMERGENCY MEDICINE
Payer: COMMERCIAL

## 2021-02-01 VITALS
TEMPERATURE: 97.7 F | OXYGEN SATURATION: 95 % | RESPIRATION RATE: 16 BRPM | WEIGHT: 226 LBS | BODY MASS INDEX: 36.32 KG/M2 | HEIGHT: 66 IN | DIASTOLIC BLOOD PRESSURE: 83 MMHG | SYSTOLIC BLOOD PRESSURE: 159 MMHG | HEART RATE: 82 BPM

## 2021-02-01 DIAGNOSIS — R68.83 CHILLS: ICD-10-CM

## 2021-02-01 DIAGNOSIS — Z91.89 MULTIPLE RISK FACTORS FOR CORONARY ARTERY DISEASE: ICD-10-CM

## 2021-02-01 DIAGNOSIS — R68.89 COLD SWEAT: ICD-10-CM

## 2021-02-01 PROCEDURE — 93010 ELECTROCARDIOGRAM REPORT: CPT | Performed by: INTERNAL MEDICINE

## 2021-02-01 PROCEDURE — 99283 EMERGENCY DEPT VISIT LOW MDM: CPT

## 2021-02-01 PROCEDURE — 99284 EMERGENCY DEPT VISIT MOD MDM: CPT | Performed by: EMERGENCY MEDICINE

## 2021-02-01 PROCEDURE — 93005 ELECTROCARDIOGRAM TRACING: CPT

## 2021-02-01 ASSESSMENT — MIFFLIN-ST. JEOR: SCORE: 1596.88

## 2021-02-01 NOTE — LETTER
February 1, 2021      To Whom It May Concern:      Corinne S Eden was seen in our Emergency Department today, 02/01/21.  Kindly excuse her tardiness from work today.  Thank you for granting allowances.    Sincerely,        Gerardo Fields MD

## 2021-02-01 NOTE — ED NOTES
"Patient is up and moving about in room.  Notes that she is \"ready\" to go home.  EKG completed and given to provider.  Patient states she is feeling much better and just \"had a scare\" patient with noted \"heavy breathing\" but states \"that's my normal\"  Call light within reach.  "

## 2021-02-01 NOTE — ED TRIAGE NOTES
"Woke up about 0500.  Had \"cold sweats\" at about 0545.  Soaked shirt.  Felt sl. Lightheaded and queasy.  Feels better now.  "

## 2021-02-01 NOTE — ED NOTES
"Patient dressed and ready for discharge.  Patient notes that her BP is higher now than before but also notes \"I'm hurting and need to take my medication\" patient is aware that she needs to have an outpatient stress test.  Patient instructed to follow up with primary after stress test and/or sooner if symptoms return and/or to come back to the emergency room.  Patient without any further questions/concerns.  Home.  "

## 2021-02-01 NOTE — ED PROVIDER NOTES
History     Chief Complaint   Patient presents with     Sweats     Dizziness     HPI  Corinne S Eden is a 65 year old female who presents with report of cold sweats and chills that lasted about 10 minutes.  She notes an approximately 5 AM she awoke this morning and performed her usual morning ritual of tending to the cat, voiding and having a cigarette as well as changing.  She notes this all went well.  Then at about 5:45 AM she felt cold sweats/chills.  She denies any other associated symptoms.  No headache.  No chest pain.  No shortness of breath.  No abdominal pain.  No urinary symptoms.  She notes symptoms promptly resolved after she called EMS.  She presents now via EMS noting that the duration is since resolved characters resolved and there are no associated symptoms or symptoms at this time.  She notes she is in her usual state of health.  She does smoke 1 pack a day and has a recent diagnosis of L3/L4 compression fractures and is maintained in a [place] back brace.  She notes no change in her back pain or respiratory status.    Allergies:  Allergies   Allergen Reactions     Penicillins        Problem List:    Patient Active Problem List    Diagnosis Date Noted     Acute bilateral low back pain with right-sided sciatica 2020     Priority: Medium     LPRD (laryngopharyngeal reflux disease) 05/15/2018     Priority: Medium     Tobacco abuse 05/15/2018     Priority: Medium     Tobacco abuse counseling 2017     Priority: Medium     Compression fracture of L4 vertebra, initial encounter (H) 2017     Priority: Medium     Closed fracture of ankle 2006     Priority: Medium     Overview:   IMO Update 10/11          Past Medical History:    No past medical history on file.    Past Surgical History:    Past Surgical History:   Procedure Laterality Date     ------------OTHER-------------      remove polyps from vocal cords      SECTION      son     FRACTURE TX, ANKLE RT/LT Right  "2005    surgery x 2       Family History:    Family History   Problem Relation Age of Onset     Coronary Artery Disease Mother 73     Myocardial Infarction Mother      Diabetes Brother      Lung Cancer Father 66     Diabetes Maternal Grandmother      Hypertension No family hx of      Hyperlipidemia No family hx of      Breast Cancer No family hx of      Colon Cancer No family hx of      Asthma No family hx of      Thyroid Disease No family hx of        Social History:  Marital Status:  Single [1]  Social History     Tobacco Use     Smoking status: Current Every Day Smoker     Packs/day: 1.00     Years: 30.00     Pack years: 30.00     Smokeless tobacco: Never Used   Substance Use Topics     Alcohol use: Yes     Comment: rarely     Drug use: No        Medications:         albuterol (PROAIR HFA/PROVENTIL HFA/VENTOLIN HFA) 108 (90 Base) MCG/ACT inhaler       albuterol (PROVENTIL) (2.5 MG/3ML) 0.083% neb solution       calcium-vitamin D (CALTRATE) 600-400 MG-UNIT per tablet       diazepam (VALIUM) 5 MG tablet       fluticasone (FLOVENT HFA) 220 MCG/ACT Inhaler          Review of Systems   Constitutional patient notes sweats/chills.  No fever.  Respiratory denies.  Cardiovascular denies.  GI denies.   no dysuria.  Heme no reported blood ending agents.  Remainder of complete 10 point review of systems negative.    Physical Exam   BP: 123/73  Pulse: 84  Temp: 97.7  F (36.5  C)  Resp: 16  Height: 167.6 cm (5' 6\")  Weight: 102.5 kg (226 lb)  SpO2: 95 %      Physical Exam  Constitutional:       Appearance: Normal appearance.   HENT:      Head: Normocephalic and atraumatic.      Right Ear: Tympanic membrane normal.      Left Ear: Tympanic membrane normal.      Nose: Nose normal. No congestion.      Mouth/Throat:      Mouth: Mucous membranes are dry.      Pharynx: No oropharyngeal exudate or posterior oropharyngeal erythema.   Eyes:      Extraocular Movements: Extraocular movements intact.      Conjunctiva/sclera: Conjunctivae " normal.      Pupils: Pupils are equal, round, and reactive to light.   Neck:      Musculoskeletal: Normal range of motion and neck supple.   Cardiovascular:      Rate and Rhythm: Normal rate.      Pulses: Normal pulses.   Pulmonary:      Effort: Pulmonary effort is normal. No respiratory distress.      Breath sounds: No wheezing.      Comments: Patient speaking in full sentences.  No suggestion of respiratory distress.  No tachypnea.  Abdominal:      General: There is no distension.      Palpations: Abdomen is soft.      Tenderness: There is no abdominal tenderness.   Musculoskeletal: Normal range of motion.      Comments: [place] back brace in place.   Skin:     General: Skin is warm and dry.      Capillary Refill: Capillary refill takes less than 2 seconds.   Neurological:      Mental Status: She is alert.      Comments: Cranial nerves II through XII intact.  Patient able to stand on her toes and heels and perform partial squat.  Gait is normal.   Psychiatric:      Comments: Patient is pleasant interactive making good eye contact and conversant.       EKG with appearance of slight ST segment depression in lateral leads with T wave inversions.  No reciprocal changes.  No STEMI.  Read in real-time by the emergency physician.  Rate 77, MN interval 146, QRS 86       No results found for this or any previous visit (from the past 24 hour(s)).    Medications - No data to display    Assessments & Plan (with Medical Decision Making)   65-year-old female presenting with report of cold sweats and chills that lasted 10 minutes.  She notes it came on very rapidly and then stopped when she called for the ambulance.  She has not had any symptoms since.  She has no complaints at this time.  Review of systems is negative.  Her exam is reassuring.  Vital signs are reassuring.  There are no other associated symptoms.  I had a long discussion with the patient and requested to check EKG and labs however she declines.  She notes  she would like to go home.  We did discuss the fact that she has risk factors for coronary disease and coronavirus is a consideration as well as the pandemic is ongoing.  She acknowledges understanding thereof and would like to go to work.  She does not want any further evaluation.  Her symptoms consisted of and are limited to what she describes as cold sweats and chills that lasted 10 minutes.  EKG with appearance of ST depression though no STEMI.  No old for comparison.  The patient was advised and would like to go home.  She notes she feels completely fine.  Discussed the importance of further testing however declines.  Plan for discharge, work note to be provided and plan for outpatient stress test.  Patient agreed to return if any new or concerning symptoms she notes she feels completely fine at this time.      New Prescriptions    No medications on file       Final diagnoses:   Cold sweat   Chills   Multiple risk factors for coronary artery disease       2/1/2021   [facility]     Gerardo Fields MD  02/01/21 [id number]

## 2021-02-03 ENCOUNTER — HOSPITAL ENCOUNTER (OUTPATIENT)
Dept: GENERAL RADIOLOGY | Facility: HOSPITAL | Age: 66
Discharge: HOME | End: 2021-02-03
Attending: PHYSICIAN ASSISTANT | Admitting: PHYSICIAN ASSISTANT
Payer: COMMERCIAL

## 2021-02-03 DIAGNOSIS — S32.000A COMPRESSION FX, LUMBAR SPINE (H): ICD-10-CM

## 2021-02-03 PROCEDURE — 72100 X-RAY EXAM L-S SPINE 2/3 VWS: CPT

## 2021-03-05 ENCOUNTER — MEDICAL CORRESPONDENCE (OUTPATIENT)
Dept: HEALTH INFORMATION MANAGEMENT | Facility: HOSPITAL | Age: 66
End: 2021-03-05

## 2021-03-05 ENCOUNTER — HOSPITAL ENCOUNTER (OUTPATIENT)
Dept: GENERAL RADIOLOGY | Facility: HOSPITAL | Age: 66
Discharge: HOME | End: 2021-03-05
Attending: PHYSICIAN ASSISTANT | Admitting: PHYSICIAN ASSISTANT
Payer: COMMERCIAL

## 2021-03-05 DIAGNOSIS — S32.030A COMPRESSION FRACTURE OF L3 VERTEBRA (H): ICD-10-CM

## 2021-03-05 PROCEDURE — 72100 X-RAY EXAM L-S SPINE 2/3 VWS: CPT

## 2021-03-17 NOTE — PROGRESS NOTES
"    Assessment & Plan     Closed compression fracture of L4 lumbar vertebra, with routine healing, subsequent encounter  Will order dexa scan to evaluate for osteoporosis. She is willing to take Fosamax if needed  - DX Hip/Pelvis/Spine; Future    Estrogen deficiency    - DX Hip/Pelvis/Spine; Future    Abnormal mammogram  Of the right breast in August  Discussed again, concerns with not having this evaluated. she is now ready to proceed with surgery referral   - GENERAL SURG ADULT REFERRAL    Review of external notes as documented elsewhere in note         BMI:   Estimated body mass index is 35.51 kg/m  as calculated from the following:    Height as of this encounter: 1.676 m (5' 6\").    Weight as of this encounter: 99.8 kg (220 lb).   Weight management plan: Discussed healthy diet and exercise guidelines        No follow-ups on file.    Yanna Epperson MD  [facility] [place] Subjective Corinne is a 65 year old who presents for the following health issues     HPI     Concern - follow up [facility]   Onset: Had x ray on 3/5/21   Description: lumbar spine x ray ordered by Dr Boyce at [facility]. Compression fracture noted at L3, advised to wear her back brace for an additional 6 weeks and a dexa scan.   Intensity: 0/10      Abnormal mammogram  This was done last August with a follow up ultrasound done of the right breast with a suspicious abnormality noted. She has not wanted to have follow up done to this point    She has concerns about her ER visit which were discussed       Review of Systems   Constitutional, HEENT, cardiovascular, pulmonary, gi and gu systems are negative, except as otherwise noted.      Objective    /78   Pulse 93   Temp 98.5  F (36.9  C) (Tympanic)   Resp 19   Ht 1.676 m (5' 6\")   Wt 99.8 kg (220 lb)   SpO2 98%   BMI 35.51 kg/m    Body mass index is 35.51 kg/m .  Physical Exam   GENERAL: healthy, alert and no distress  RESP: lungs clear to auscultation - no rales, rhonchi or " wheezes  CV: regular rate and rhythm, normal S1 S2, no S3 or S4, no murmur, click or rub, no peripheral edema and peripheral pulses strong  MS: no gross musculoskeletal defects noted, no edema, no tenderness of the lumbar spine or lumbar paraspinous muscles   NEURO: Normal strength and tone, mentation intact and speech normal  PSYCH: mentation appears normal, affect normal/bright

## 2021-03-18 ENCOUNTER — OFFICE VISIT (OUTPATIENT)
Dept: FAMILY MEDICINE | Facility: OTHER | Age: 66
End: 2021-03-18
Attending: FAMILY MEDICINE
Payer: COMMERCIAL

## 2021-03-18 VITALS
HEART RATE: 93 BPM | HEIGHT: 66 IN | OXYGEN SATURATION: 98 % | WEIGHT: 220 LBS | RESPIRATION RATE: 19 BRPM | SYSTOLIC BLOOD PRESSURE: 128 MMHG | DIASTOLIC BLOOD PRESSURE: 78 MMHG | TEMPERATURE: 98.5 F | BODY MASS INDEX: 35.36 KG/M2

## 2021-03-18 DIAGNOSIS — S32.040D CLOSED COMPRESSION FRACTURE OF L4 LUMBAR VERTEBRA, WITH ROUTINE HEALING, SUBSEQUENT ENCOUNTER: Primary | ICD-10-CM

## 2021-03-18 DIAGNOSIS — Z71.6 TOBACCO ABUSE COUNSELING: ICD-10-CM

## 2021-03-18 DIAGNOSIS — R92.8 ABNORMAL MAMMOGRAM: ICD-10-CM

## 2021-03-18 DIAGNOSIS — Z72.0 TOBACCO USE: ICD-10-CM

## 2021-03-18 DIAGNOSIS — E28.39 ESTROGEN DEFICIENCY: ICD-10-CM

## 2021-03-18 PROCEDURE — 99213 OFFICE O/P EST LOW 20 MIN: CPT | Performed by: FAMILY MEDICINE

## 2021-03-18 ASSESSMENT — PAIN SCALES - GENERAL: PAINLEVEL: NO PAIN (0)

## 2021-03-18 ASSESSMENT — MIFFLIN-ST. JEOR: SCORE: 1569.66

## 2021-03-18 NOTE — NURSING NOTE
"Chief Complaint   Patient presents with     RECHECK     Flu Shot     declined     Imm/Inj     declined shingrix and pneumonia        Initial /78   Pulse 93   Temp 98.5  F (36.9  C) (Tympanic)   Resp 19   Ht 1.676 m (5' 6\")   Wt 99.8 kg (220 lb)   SpO2 98%   BMI 35.51 kg/m   Estimated body mass index is 35.51 kg/m  as calculated from the following:    Height as of this encounter: 1.676 m (5' 6\").    Weight as of this encounter: 99.8 kg (220 lb).  Medication Reconciliation: complete  Sharon Méndez LPN    "

## 2021-03-25 ENCOUNTER — HOSPITAL ENCOUNTER (OUTPATIENT)
Dept: BONE DENSITY | Facility: HOSPITAL | Age: 66
Discharge: HOME | End: 2021-03-25
Attending: FAMILY MEDICINE | Admitting: FAMILY MEDICINE
Payer: COMMERCIAL

## 2021-03-25 DIAGNOSIS — S32.040D CLOSED COMPRESSION FRACTURE OF L4 LUMBAR VERTEBRA, WITH ROUTINE HEALING, SUBSEQUENT ENCOUNTER: ICD-10-CM

## 2021-03-25 DIAGNOSIS — E28.39 ESTROGEN DEFICIENCY: ICD-10-CM

## 2021-03-25 DIAGNOSIS — M81.6 LOCALIZED OSTEOPOROSIS WITHOUT CURRENT PATHOLOGICAL FRACTURE: Primary | ICD-10-CM

## 2021-03-25 PROCEDURE — 77080 DXA BONE DENSITY AXIAL: CPT

## 2021-03-25 RX ORDER — ALENDRONATE SODIUM 70 MG/1
70 TABLET ORAL
Qty: 12 TABLET | Refills: 3 | Status: SHIPPED | OUTPATIENT
Start: 2021-03-25 | End: 2023-06-14

## 2021-04-18 ENCOUNTER — HEALTH MAINTENANCE LETTER (OUTPATIENT)
Age: 66
End: 2021-04-18

## 2021-06-30 DIAGNOSIS — R06.02 SHORTNESS OF BREATH: ICD-10-CM

## 2021-06-30 RX ORDER — ALBUTEROL SULFATE 0.83 MG/ML
SOLUTION RESPIRATORY (INHALATION)
Qty: 150 ML | Refills: 0 | Status: SHIPPED | OUTPATIENT
Start: 2021-06-30 | End: 2021-08-17

## 2021-06-30 NOTE — TELEPHONE ENCOUNTER
Albuterol Sulfate (2.5 MG/3ML) 0.083% Inhalation Nebulization Solution  Last Written Prescription Date:  11/30/2020  Last Fill Quantity: 50 vial,   # refills: 3  Last Office Visit: 3/18/21  Future Office visit:       Routing refill request to provider for review/approval because:

## 2021-08-17 DIAGNOSIS — R06.02 SHORTNESS OF BREATH: ICD-10-CM

## 2021-08-17 RX ORDER — ALBUTEROL SULFATE 0.83 MG/ML
SOLUTION RESPIRATORY (INHALATION)
Qty: 150 ML | Refills: 0 | Status: SHIPPED | OUTPATIENT
Start: 2021-08-17 | End: 2021-10-12

## 2021-09-30 ENCOUNTER — TELEPHONE (OUTPATIENT)
Dept: FAMILY MEDICINE | Facility: OTHER | Age: 66
End: 2021-09-30

## 2021-10-03 ENCOUNTER — HEALTH MAINTENANCE LETTER (OUTPATIENT)
Age: 66
End: 2021-10-03

## 2021-10-06 NOTE — TELEPHONE ENCOUNTER
FYI Only:     Call from patient stating she will follow up on Mammogram as suggested by Dr. MARLEEN Epperson [facility]

## 2021-10-11 DIAGNOSIS — R06.02 SHORTNESS OF BREATH: ICD-10-CM

## 2021-10-12 RX ORDER — ALBUTEROL SULFATE 0.83 MG/ML
SOLUTION RESPIRATORY (INHALATION)
Qty: 150 ML | Refills: 0 | Status: SHIPPED | OUTPATIENT
Start: 2021-10-12 | End: 2021-12-01

## 2021-10-27 ENCOUNTER — TELEPHONE (OUTPATIENT)
Dept: FAMILY MEDICINE | Facility: OTHER | Age: 66
End: 2021-10-27

## 2021-10-27 NOTE — TELEPHONE ENCOUNTER
Received certified letter back, unopened, 10-  Please note phone call received by staff 10-6-21  Yanna Epperson MD

## 2021-11-29 DIAGNOSIS — R06.02 SHORTNESS OF BREATH: ICD-10-CM

## 2021-12-01 ENCOUNTER — TELEPHONE (OUTPATIENT)
Dept: FAMILY MEDICINE | Facility: OTHER | Age: 66
End: 2021-12-01
Payer: COMMERCIAL

## 2021-12-01 DIAGNOSIS — R05.9 COUGH: Primary | ICD-10-CM

## 2021-12-01 RX ORDER — ALBUTEROL SULFATE 90 UG/1
AEROSOL, METERED RESPIRATORY (INHALATION)
Qty: 18 G | Refills: 0 | Status: SHIPPED | OUTPATIENT
Start: 2021-12-01 | End: 2023-06-14

## 2021-12-01 RX ORDER — ALBUTEROL SULFATE 0.83 MG/ML
SOLUTION RESPIRATORY (INHALATION)
Qty: 150 ML | Refills: 0 | Status: SHIPPED | OUTPATIENT
Start: 2021-12-01 | End: 2023-05-24

## 2021-12-01 NOTE — TELEPHONE ENCOUNTER
Albuterol HFA      Last Written Prescription Date:  8/27/20  Last Fill Quantity: 1,   # refills: 1  Last Office Visit: 3/18/21  Future Office visit:       Routing refill request to provider for review/approval because:        Neb      Last Written Prescription Date:  10/12/21  Last Fill Quantity: 150 mL,   # refills: 0  Last Office Visit: 3/18/21  Future Office visit:       Routing refill request to provider for review/approval because:

## 2021-12-01 NOTE — TELEPHONE ENCOUNTER
Received a PA from [employer] for Ventolin  (90 Base)MCG/ACT aerosol. Submitted on CMM. Waiting for a response.

## 2021-12-02 RX ORDER — ALBUTEROL SULFATE 90 UG/1
2 AEROSOL, METERED RESPIRATORY (INHALATION) EVERY 6 HOURS
Qty: 18 G | Refills: 3 | Status: SHIPPED | OUTPATIENT
Start: 2021-12-02 | End: 2024-05-13

## 2021-12-02 NOTE — TELEPHONE ENCOUNTER
Received a DENIAL from [employer] for Ventolin  (90 base)mcg/act aerosol.     Forms scanned to [facility].

## 2022-03-03 NOTE — TELEPHONE ENCOUNTER
11:41 AM    Reason for Call: Phone Call    Description: pt reports no improvement in lower back pain since last visit with Dr. Weiss    Was an appointment offered for this call? Yes  If yes : Appointment type SHORT              Date first available (late August); pt cannot wait that long    Preferred method for responding to this message: Telephone Call  What is your phone number ?[phone]    If we cannot reach you directly, may we leave a detailed response at the number you provided? Yes    Can this message wait until your PCP/provider returns, if available today? Not applicable, Provider is in today    Quin Shahid    
Please schedule with Dr. Thais [facility]. We don't have any openings for awhile.   
Pt would like to wait, she saw OA last week on Friday. She will wait to see if she does not get any better by the end of the week and call back then  
See if pt is willing to do a telephone visit. If not we can see her Thursday 8/20/20 at 8:15.  
98.8

## 2022-05-14 ENCOUNTER — HEALTH MAINTENANCE LETTER (OUTPATIENT)
Age: 67
End: 2022-05-14

## 2022-09-04 ENCOUNTER — HEALTH MAINTENANCE LETTER (OUTPATIENT)
Age: 67
End: 2022-09-04

## 2023-01-15 ENCOUNTER — HEALTH MAINTENANCE LETTER (OUTPATIENT)
Age: 68
End: 2023-01-15

## 2023-05-08 ENCOUNTER — APPOINTMENT (OUTPATIENT)
Dept: GENERAL RADIOLOGY | Facility: HOSPITAL | Age: 68
End: 2023-05-08
Attending: NURSE PRACTITIONER
Payer: MEDICARE

## 2023-05-08 ENCOUNTER — HOSPITAL ENCOUNTER (EMERGENCY)
Facility: HOSPITAL | Age: 68
Discharge: HOME | End: 2023-05-08
Attending: NURSE PRACTITIONER | Admitting: NURSE PRACTITIONER
Payer: MEDICARE

## 2023-05-08 VITALS
TEMPERATURE: 97.6 F | HEART RATE: 80 BPM | RESPIRATION RATE: 17 BRPM | OXYGEN SATURATION: 94 % | SYSTOLIC BLOOD PRESSURE: 160 MMHG | DIASTOLIC BLOOD PRESSURE: 90 MMHG

## 2023-05-08 DIAGNOSIS — M54.6 BILATERAL THORACIC BACK PAIN, UNSPECIFIED CHRONICITY: ICD-10-CM

## 2023-05-08 DIAGNOSIS — R07.89 CHEST WALL PAIN: Primary | ICD-10-CM

## 2023-05-08 DIAGNOSIS — M54.6 BILATERAL THORACIC BACK PAIN: ICD-10-CM

## 2023-05-08 PROCEDURE — G0463 HOSPITAL OUTPT CLINIC VISIT: HCPCS

## 2023-05-08 PROCEDURE — 99213 OFFICE O/P EST LOW 20 MIN: CPT | Performed by: NURSE PRACTITIONER

## 2023-05-08 PROCEDURE — 71046 X-RAY EXAM CHEST 2 VIEWS: CPT

## 2023-05-08 PROCEDURE — G0463 HOSPITAL OUTPT CLINIC VISIT: HCPCS | Mod: 25

## 2023-05-08 RX ORDER — PREDNISONE 20 MG/1
TABLET ORAL
Qty: 10 TABLET | Refills: 0 | Status: SHIPPED | OUTPATIENT
Start: 2023-05-08 | End: 2023-06-14

## 2023-05-08 ASSESSMENT — ENCOUNTER SYMPTOMS
DIARRHEA: 0
VOMITING: 0
FEVER: 0
PSYCHIATRIC NEGATIVE: 1
NECK PAIN: 0
COUGH: 1
SHORTNESS OF BREATH: 1
CHILLS: 0
NAUSEA: 0
NECK STIFFNESS: 0

## 2023-05-08 NOTE — ED TRIAGE NOTES
States the pain is in between shoulder blades and in the sternum. Hx of smoking 1PPD for 45y. Worse with coughing, states sitting here it's a 7/10. Ongoing for 6 weeks. Denies any URI sx. Just pain. Denies fevers.     Jessa Behrman, SAMMIEN         12-Oct-2019 15:00 12-Oct-2019 09:07

## 2023-05-08 NOTE — DISCHARGE INSTRUCTIONS
Rest  Alternate ice and heat  Prednisone for pain  Over the counter topical anesthetics such as Bengay, icy hot, Biofreeze, Lidoderm patches as needed  Go to your scheduled appointment with Sandra on Thursday   Return to urgent care/ED with any worsening in condition or additional concerns.

## 2023-05-08 NOTE — ED PROVIDER NOTES
History     Chief Complaint   Patient presents with     Chest Wall Pain     HPI  Corinne S Eden is a 67 year old female who presents to urgent care today ambulatory with complaints of right-sided chest wall pain and thoracic back pain which has been ongoing for the past 6 weeks.  Pain increases with activity or coughing.  Denies any recent URI prior to onset of pain.  Denies any fall, injury or trauma.  Has COPD, smokes 1 pack/day.  History of lower back pain.  Denies any fever, chills, nausea, vomiting or diarrhea.  Shortness of breath at baseline with COPD, not worsening.  No history of DVT/PE, not on any blood thinners.  Declines cardiac work-up such as EKG and labs today.  No other concerns.    Allergies:  Allergies   Allergen Reactions     Pcn [Penicillins]        Problem List:    Patient Active Problem List    Diagnosis Date Noted     Abnormal mammogram 2021     Priority: Medium     right  Patient declining follow up testing       Acute bilateral low back pain with right-sided sciatica 2020     Priority: Medium     LPRD (laryngopharyngeal reflux disease) 05/15/2018     Priority: Medium     Tobacco use 05/15/2018     Priority: Medium     Tobacco abuse counseling 2017     Priority: Medium     Closed compression fracture of L4 lumbar vertebra, with routine healing, subsequent encounter 2017     Priority: Medium     Closed fracture of ankle 2006     Priority: Medium     Overview:   IMO Update 10/11          Past Medical History:    No past medical history on file.    Past Surgical History:    Past Surgical History:   Procedure Laterality Date     ------------OTHER-------------      remove polyps from vocal cords      SECTION      son     FRACTURE TX, ANKLE RT/LT Right     surgery x 2       Family History:    Family History   Problem Relation Age of Onset     Coronary Artery Disease Mother 73     Myocardial Infarction Mother      Diabetes Brother      Lung Cancer  Father 66     Diabetes Maternal Grandmother      Hypertension No family hx of      Hyperlipidemia No family hx of      Breast Cancer No family hx of      Colon Cancer No family hx of      Asthma No family hx of      Thyroid Disease No family hx of        Social History:  Marital Status:  Single [1]  Social History     Tobacco Use     Smoking status: Every Day     Packs/day: 1.00     Years: 30.00     Pack years: 30.00     Types: Cigarettes     Smokeless tobacco: Never   Substance Use Topics     Alcohol use: Yes     Comment: rarely     Drug use: No        Medications:    albuterol (PROAIR HFA/PROVENTIL HFA/VENTOLIN HFA) 108 (90 Base) MCG/ACT inhaler  albuterol (PROVENTIL) (2.5 MG/3ML) 0.083% neb solution  alendronate (FOSAMAX) 70 MG tablet  calcium-vitamin D (CALTRATE) 600-400 MG-UNIT per tablet  fluticasone (FLOVENT HFA) 220 MCG/ACT Inhaler  predniSONE (DELTASONE) 20 MG tablet  VENTOLIN  (90 Base) MCG/ACT inhaler      Review of Systems   Constitutional: Negative for chills and fever.   Respiratory: Positive for cough (infrequent) and shortness of breath (Has COPD, no increase in SOB from baseline).    Cardiovascular: Positive for chest pain (right sided chest wall pain).   Gastrointestinal: Negative for abdominal pain, diarrhea, nausea and vomiting.   Genitourinary: Negative for dysuria, frequency and hematuria.   Musculoskeletal: Positive for back pain (Thoracic). Negative for gait problem, neck pain and neck stiffness.   Skin: Negative.    Psychiatric/Behavioral: Negative.      Physical Exam   BP: (!) 185/117  Pulse: 89  Temp: 97.6  F (36.4  C)  Resp: 18  SpO2: 97 %  Recheck  BP: 160/90    Physical Exam  Vitals and nursing note reviewed.   Constitutional:       General: She is not in acute distress.     Appearance: Normal appearance. She is not ill-appearing or toxic-appearing.   HENT:      Right Ear: Tympanic membrane, ear canal and external ear normal.      Left Ear: Tympanic membrane, ear canal and  external ear normal.      Nose: Nose normal.      Mouth/Throat:      Mouth: Mucous membranes are moist.      Pharynx: Oropharynx is clear.   Eyes:      Extraocular Movements: Extraocular movements intact.      Conjunctiva/sclera: Conjunctivae normal.      Pupils: Pupils are equal, round, and reactive to light.   Cardiovascular:      Rate and Rhythm: Normal rate and regular rhythm.      Pulses: Normal pulses.      Heart sounds: Normal heart sounds.   Pulmonary:      Effort: Pulmonary effort is normal.      Breath sounds: Normal breath sounds.   Chest:      Chest wall: No mass, lacerations, deformity, swelling, tenderness, crepitus or edema.   Abdominal:      General: Bowel sounds are normal.      Palpations: Abdomen is soft.      Tenderness: There is no abdominal tenderness. There is no right CVA tenderness or left CVA tenderness.   Musculoskeletal:      Cervical back: Normal.      Thoracic back: Tenderness present.      Lumbar back: Normal.   Neurological:      Mental Status: She is alert.       ED Course     Results for orders placed or performed during the hospital encounter of 05/08/23 (from the past 24 hour(s))   Chest XR,  PA & LAT    Narrative    Procedure:XR CHEST 2 VIEWS    Clinical history:Female, 67 years, chest wall pain, cough    Technique: Two views are submitted.    Comparison: 5/15/2018    Findings: The cardiac silhouette is normal. The pulmonary vasculature  is normal.    The lungs are again hyperinflated. There is diffuse interstitial  thickening, similar in appearance compared to prior study. Bony  structures are unremarkable.      Impression    Impression:   No acute abnormality.     Persistent hyperinflation of the lungs with interstitial thickening.  No evidence of well-defined pneumonia, CHF or other acute abnormality.  No apparent pneumothorax.    ANTONIO PATINO MD         SYSTEM ID:  [id number]       Medications - No data to display    Assessments & Plan (with Medical Decision Making)     I  have reviewed the nursing notes.    I have reviewed the findings, diagnosis, plan and need for follow up with the patient.  (R07.89) Chest wall pain  (primary encounter diagnosis)  (M54.6) Bilateral thoracic back pain  Plan:  Patient ambulatory with a nontoxic appearance.  Patient able to stand from a seated position in order to ambulate without difficulty.  Patient has had thoracic back pain and right-sided chest wall pain for the past 6 weeks, not worsening but not improving.  Denies any recent illness/infection.  Denies any fever, chills, nausea, vomiting, diarrhea.  Shortness of breath at baseline with COPD, not worsening.  No spinal tenderness.  Chest x-ray completed as patient concerned symptoms are related to lungs which shows no acute abnormality.  Persistent hyperinflation of the lungs with interstitial thickening.  No evidence of well-defined pneumonia, CHF or any other acute abnormality.  No apparent pneumothorax.  Declines cardiac work-up or thoracic back x-rays today.  Denies any dysuria, frequency or hematuria.  No CVA tenderness.  Reviewed PE work-up and patient declines at this time, will monitor.  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  Has an appointment to establish care on Thursday with Sandra Zamora NP, and will follow-up with her if symptoms have not improved with the prednisone.  Patient strongly encouraged to return to the emergency department if experiencing any worsening in symptoms or additional concerns.  Patient states she will follow-up as needed.  Patient in agreement with treatment plan.    Discharge Medication List as of 5/8/2023  6:38 PM        Final diagnoses:   Chest wall pain   Bilateral thoracic back pain     5/8/2023   [facility]     Madhuri Garcia NP  05/09/23 0916

## 2023-05-08 NOTE — ED TRIAGE NOTES
C/o right side chest wall pain under right breast for 6 weeks that increases with coughing. 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

## 2023-05-09 ASSESSMENT — ENCOUNTER SYMPTOMS
ABDOMINAL PAIN: 0
FREQUENCY: 0
BACK PAIN: 1
HEMATURIA: 0
DYSURIA: 0

## 2023-05-17 ENCOUNTER — ANCILLARY PROCEDURE (OUTPATIENT)
Dept: GENERAL RADIOLOGY | Facility: OTHER | Age: 68
End: 2023-05-17
Attending: NURSE PRACTITIONER
Payer: MEDICARE

## 2023-05-17 ENCOUNTER — OFFICE VISIT (OUTPATIENT)
Dept: FAMILY MEDICINE | Facility: OTHER | Age: 68
End: 2023-05-17
Attending: NURSE PRACTITIONER
Payer: MEDICARE

## 2023-05-17 VITALS
DIASTOLIC BLOOD PRESSURE: 82 MMHG | TEMPERATURE: 97.2 F | BODY MASS INDEX: 33.57 KG/M2 | OXYGEN SATURATION: 94 % | WEIGHT: 208 LBS | HEART RATE: 106 BPM | SYSTOLIC BLOOD PRESSURE: 138 MMHG

## 2023-05-17 DIAGNOSIS — Z12.31 VISIT FOR SCREENING MAMMOGRAM: ICD-10-CM

## 2023-05-17 DIAGNOSIS — Z87.891 PERSONAL HISTORY OF TOBACCO USE: ICD-10-CM

## 2023-05-17 DIAGNOSIS — Z11.59 NEED FOR HEPATITIS C SCREENING TEST: ICD-10-CM

## 2023-05-17 DIAGNOSIS — R19.5 POSITIVE COLORECTAL CANCER SCREENING USING COLOGUARD TEST: ICD-10-CM

## 2023-05-17 DIAGNOSIS — R06.2 WHEEZING: ICD-10-CM

## 2023-05-17 DIAGNOSIS — M81.6 LOCALIZED OSTEOPOROSIS WITHOUT CURRENT PATHOLOGICAL FRACTURE: ICD-10-CM

## 2023-05-17 DIAGNOSIS — Z11.4 SCREENING FOR HIV (HUMAN IMMUNODEFICIENCY VIRUS): ICD-10-CM

## 2023-05-17 DIAGNOSIS — M54.6 MIDLINE THORACIC BACK PAIN, UNSPECIFIED CHRONICITY: ICD-10-CM

## 2023-05-17 DIAGNOSIS — Z76.89 ENCOUNTER TO ESTABLISH CARE: Primary | ICD-10-CM

## 2023-05-17 DIAGNOSIS — Z13.220 LIPID SCREENING: ICD-10-CM

## 2023-05-17 LAB
ALBUMIN SERPL BCG-MCNC: 3.9 G/DL (ref 3.5–5.2)
ALP SERPL-CCNC: 58 U/L (ref 35–104)
ALT SERPL W P-5'-P-CCNC: 32 U/L (ref 10–35)
ANION GAP SERPL CALCULATED.3IONS-SCNC: 5 MMOL/L (ref 7–15)
AST SERPL W P-5'-P-CCNC: 25 U/L (ref 10–35)
BILIRUB SERPL-MCNC: 0.4 MG/DL
BUN SERPL-MCNC: 32.7 MG/DL (ref 8–23)
CALCIUM SERPL-MCNC: 9.2 MG/DL (ref 8.8–10.2)
CHLORIDE SERPL-SCNC: 101 MMOL/L (ref 98–107)
CHOLEST SERPL-MCNC: 182 MG/DL
CREAT SERPL-MCNC: 0.66 MG/DL (ref 0.51–0.95)
DEPRECATED HCO3 PLAS-SCNC: 32 MMOL/L (ref 22–29)
GFR SERPL CREATININE-BSD FRML MDRD: >90 ML/MIN/1.73M2
GLUCOSE SERPL-MCNC: 96 MG/DL (ref 70–99)
HDLC SERPL-MCNC: 53 MG/DL
LDLC SERPL CALC-MCNC: 109 MG/DL
NONHDLC SERPL-MCNC: 129 MG/DL
POTASSIUM SERPL-SCNC: 4.1 MMOL/L (ref 3.4–5.3)
PROT SERPL-MCNC: 6.8 G/DL (ref 6.4–8.3)
SODIUM SERPL-SCNC: 138 MMOL/L (ref 136–145)
TRIGL SERPL-MCNC: 99 MG/DL

## 2023-05-17 PROCEDURE — 99215 OFFICE O/P EST HI 40 MIN: CPT | Performed by: NURSE PRACTITIONER

## 2023-05-17 PROCEDURE — 80061 LIPID PANEL: CPT | Mod: ZL | Performed by: NURSE PRACTITIONER

## 2023-05-17 PROCEDURE — 36415 COLL VENOUS BLD VENIPUNCTURE: CPT | Mod: ZL | Performed by: NURSE PRACTITIONER

## 2023-05-17 PROCEDURE — 87389 HIV-1 AG W/HIV-1&-2 AB AG IA: CPT | Mod: ZL | Performed by: NURSE PRACTITIONER

## 2023-05-17 PROCEDURE — 86803 HEPATITIS C AB TEST: CPT | Mod: ZL | Performed by: NURSE PRACTITIONER

## 2023-05-17 PROCEDURE — G0296 VISIT TO DETERM LDCT ELIG: HCPCS | Performed by: NURSE PRACTITIONER

## 2023-05-17 PROCEDURE — 72072 X-RAY EXAM THORAC SPINE 3VWS: CPT | Mod: TC

## 2023-05-17 PROCEDURE — G0463 HOSPITAL OUTPT CLINIC VISIT: HCPCS | Mod: 25

## 2023-05-17 PROCEDURE — 80053 COMPREHEN METABOLIC PANEL: CPT | Mod: ZL | Performed by: NURSE PRACTITIONER

## 2023-05-17 PROCEDURE — 82306 VITAMIN D 25 HYDROXY: CPT | Mod: ZL | Performed by: NURSE PRACTITIONER

## 2023-05-17 RX ORDER — ALBUTEROL SULFATE 90 UG/1
2 AEROSOL, METERED RESPIRATORY (INHALATION) EVERY 6 HOURS PRN
Qty: 18 G | Refills: 3 | Status: SHIPPED | OUTPATIENT
Start: 2023-05-17 | End: 2023-06-14

## 2023-05-17 RX ORDER — METHOCARBAMOL 500 MG/1
500 TABLET, FILM COATED ORAL 4 TIMES DAILY PRN
Qty: 30 TABLET | Refills: 1 | Status: SHIPPED | OUTPATIENT
Start: 2023-05-17 | End: 2024-05-13

## 2023-05-17 ASSESSMENT — ANXIETY QUESTIONNAIRES
6. BECOMING EASILY ANNOYED OR IRRITABLE: MORE THAN HALF THE DAYS
2. NOT BEING ABLE TO STOP OR CONTROL WORRYING: NEARLY EVERY DAY
1. FEELING NERVOUS, ANXIOUS, OR ON EDGE: NEARLY EVERY DAY
3. WORRYING TOO MUCH ABOUT DIFFERENT THINGS: SEVERAL DAYS
GAD7 TOTAL SCORE: 18
IF YOU CHECKED OFF ANY PROBLEMS ON THIS QUESTIONNAIRE, HOW DIFFICULT HAVE THESE PROBLEMS MADE IT FOR YOU TO DO YOUR WORK, TAKE CARE OF THINGS AT HOME, OR GET ALONG WITH OTHER PEOPLE: EXTREMELY DIFFICULT
4. TROUBLE RELAXING: NEARLY EVERY DAY
7. FEELING AFRAID AS IF SOMETHING AWFUL MIGHT HAPPEN: NEARLY EVERY DAY
GAD7 TOTAL SCORE: 18
5. BEING SO RESTLESS THAT IT IS HARD TO SIT STILL: NEARLY EVERY DAY

## 2023-05-17 ASSESSMENT — PAIN SCALES - GENERAL: PAINLEVEL: EXTREME PAIN (9)

## 2023-05-17 NOTE — PATIENT INSTRUCTIONS
Try using pain cream a couple times a day up to 4 times a day  Can use ibuprofen 600-800 mg up to 3 times a day   Robaxin for muscle relaxant as needed       Lung Cancer Screening   Frequently Asked Questions  If you are at high-risk for lung cancer, getting screened with low-dose computed tomography (LDCT) every year can help save your life. This handout offers answers to some of the most common questions about lung cancer screening. If you have other questions, please call [phone] ([phone]).     What is it?  Lung cancer screening uses special X-ray technology to create an image of your lung tissue. The exam is quick and easy and takes less than 10 seconds. We don t give you any medicine or use any needles. You can eat before and after the exam. You don t need to change your clothes as long as the clothing on your chest doesn t contain metal. But, you do need to be able to hold your breath for at least 6 seconds during the exam.    What is the goal of lung cancer screening?  The goal of lung cancer screening is to save lives. Many times, lung cancer is not found until a person starts having physical symptoms. Lung cancer screening can help detect lung cancer in the earliest stages when it may be easier to treat.    Who should be screened for lung cancer?  We suggest lung cancer screening for anyone who is at high-risk for lung cancer. You are in the high-risk group if you:     are between the ages of 55 and 79, and   have smoked at least 1 pack of cigarettes a day for 20 or more years, and   still smoke or have quit within the past 15 years.    However, if you have a new cough or shortness of breath, you should talk to your doctor before being screened.    Why does it matter if I have symptoms?  Certain symptoms can be a sign that you have a condition in your lungs that should be checked and treated by your doctor. These symptoms include fever, chest pain, a new or changing cough, shortness of  breath that you have never felt before, coughing up blood or unexplained weight loss. Having any of these symptoms can greatly affect the results of lung cancer screening.       Should all smokers get an LDCT lung cancer screening exam?  It depends. Lung cancer screening is for a very specific group of men and women who have a history of heavy smoking over a long period of time (see  Who should be screened for lung cancer  above).  I am in the high-risk group, but have been diagnosed with cancer in the past. Is LDCT lung cancer screening right for me?  In some cases, you should not have LDCT lung screening, such as when your doctor is already following your cancer with CT scan studies. Your doctor will help you decide if LDCT lung screening is right for you.  Do I need to have a screening exam every year?  Yes. If you are in the high-risk group described earlier, you should get an LDCT lung cancer screening exam every year until you are 79, or are no longer willing or able to undergo screening and possible procedures to diagnose and treat lung cancer.  How effective is LDCT at preventing death from lung cancer?  Studies have shown that LDCT lung cancer screening can lower the risk of death from lung cancer by 20 percent in people who are at high-risk.  What are the risks?  There are some risks and limitations of LDCT lung cancer screening. We want to make sure you understand the risks and benefits, so please let us know if you have any questions. Your doctor may want to talk with you more about these risks.   Radiation exposure: As with any exam that uses radiation, there is a very small increased risk of cancer. The amount of radiation in LDCT is small--about the same amount a person would get from a mammogram. Your doctor orders the exam when he or she feels the potential benefits outweigh the risks.   False negatives: No test is perfect, including LDCT. It is possible that you may have a medical condition,  including lung cancer, that is not found during your exam. This is called a false negative result.   False positives and more testing: LDCT very often finds something in the lung that could be cancer, but in fact is not. This is called a false positive result. False positive tests often cause anxiety. To make sure these findings are not cancer, you may need to have more tests. These tests will be done only if you give us permission. Sometimes patients need a treatment that can have side effects, such as a biopsy. For more information on false positives, see  What can I expect from the results?    Findings not related to lung cancer: Your LDCT exam also takes pictures of areas of your body next to your lungs. In a very small number of cases, the CT scan will show an abnormal finding in one of these areas, such as your kidneys, adrenal glands, liver or thyroid. This finding may not be serious, but you may need more tests. Your doctor can help you decide what other tests you may need, if any.  What can I expect from the results?  About 1 out of 4 LDCT exams will find something that may need more tests. Most of the time, these findings are lung nodules. Lung nodules are very small collections of tissue in the lung. These nodules are very common, and the vast majority--more than 97 percent--are not cancer (benign). Most are normal lymph nodes or small areas of scarring from past infections.  But, if a small lung nodule is found to be cancer, the cancer can be cured more than 90 percent of the time. To know if the nodule is cancer, we may need to get more images before your next yearly screening exam. If the nodule has suspicious features (for example, it is large, has an odd shape or grows over time), we will refer you to a specialist for further testing.  Will my doctor also get the results?  Yes. Your doctor will get a copy of your results.  Is it okay to keep smoking now that there s a cancer screening exam?  No.  Tobacco is one of the strongest cancer-causing agents. It causes not only lung cancer, but other cancers and cardiovascular (heart) diseases as well. The damage caused by smoking builds over time. This means that the longer you smoke, the higher your risk of disease. While it is never too late to quit, the sooner you quit, the better.  Where can I find help to quit smoking?  The best way to prevent lung cancer is to stop smoking. If you have already quit smoking, congratulations and keep it up! For help on quitting smoking, please call [employer] at 2-889-QUITNOW (1-453.784.7190) or the [employer] at 1-291.849.7727 to find local resources near you.  One-on-one health coaching:  If you d prefer to work individually with a health care provider on tobacco cessation, we offer:     Medication Therapy Management:  Our specially trained pharmacists work closely with you and your doctor to help you quit smoking.  Call [phone] or [phone] (toll free).

## 2023-05-17 NOTE — PROGRESS NOTES
Assessment & Plan     Encounter to establish care  Ok to establish care  Normal sodium, potassium and glucose  Urea nitrogen elevated with normal creatinine and GFR  Normal liver function   - Comprehensive metabolic panel (BMP + Alb, Alk Phos, ALT, AST, Total. Bili, TP); Future  - Comprehensive metabolic panel (BMP + Alb, Alk Phos, ALT, AST, Total. Bili, TP)    Lipid screening  Cholesterol <200, HDL 53,  - but risk score is 11.3.  Work on healthy lifestyle changes and consider statin in the future   - Lipid Profile (Chol, Trig, HDL, LDL calc); Future  - Lipid Profile (Chol, Trig, HDL, LDL calc)  The 10-year ASCVD risk score (Anne VASQUEZ, et al., 2019) is: 11.3%    Values used to calculate the score:      Age: 67 years      Sex: Female      Is Non- : No      Diabetic: No      Tobacco smoker: Yes      Systolic Blood Pressure: 138 mmHg      Is BP treated: No      HDL Cholesterol: 53 mg/dL      Total Cholesterol: 182 mg/dL      Visit for screening mammogram  Declined today - may consider later on     Positive colorectal cancer screening using Cologuard test  Continues to decline for now, but may consider colonoscopy in the future.  She is aware she should have colonoscopy completed     Localized osteoporosis without current pathological fracture  Stopped fosamax  Plan to update DEXA  Continue with calcium and vitamin D    - DX Hip/Pelvis/Spine; Future  - Vitamin D Deficiency; Future  - Vitamin D Deficiency    Screening for HIV (human immunodeficiency virus  - HIV Antigen Antibody Combo; Future  - HIV Antigen Antibody Combo    Need for hepatitis C screening test  - Hepatitis C Screen Reflex to HCV RNA Quant and Genotype; Future  - Hepatitis C Screen Reflex to HCV RNA Quant and Genotype    Personal history of tobacco use  Encouraged smoking cessation   Agreed to lung cancer screening   - Prof fee: Shared Decision Making for Lung Cancer Screening  - CT Chest Lung Cancer Scrn Low Dose wo;  Future    Midline thoracic back pain, unspecified chronicity  History of compression fracture to lumbar spine.  She is nervous she had another.  XR shows DDD  Plan to start with pain cream. Muscle relaxant and PT.  If no improvement or worsening pain consider MRI and IR referral   - XR THORACIC SPINE 3 VW (Clinic Performed); Future  - methocarbamol (ROBAXIN) 500 MG tablet; Take 1 tablet (500 mg) by mouth 4 times daily as needed for muscle spasms  - diclofenac (VOLTAREN) 1 % topical gel; Apply 4 g topically 4 times daily  - Physical Therapy Referral; Future    Wheezing  Concerns for possible COPD - plan to start with intermittent use of inhaler - consider spirometry in the future   - albuterol (PROAIR HFA/PROVENTIL HFA/VENTOLIN HFA) 108 (90 Base) MCG/ACT inhaler; Inhale 2 puffs into the lungs every 6 hours as needed for shortness of breath, wheezing or cough    Ordering of each unique test  I spent a total of 57 minutes on the day of the visit.   Time spent by me doing chart review, history and exam, documentation and further activities per the note       Nicotine/Tobacco Cessation:  She reports that she has been smoking cigarettes. She has a 30.00 pack-year smoking history. She has never used smokeless tobacco.  Nicotine/Tobacco Cessation Plan:   Information offered: Patient not interested at this time      See Patient Instructions    Return in about 4 weeks (around 6/14/2023) for back pain .    DEVORAH [facility] [place] Subjective Corinne is a 67 year old, presenting for the following health issues:  Establish Care and chronic pain         View : No data to display.              HPI     Establish care     Smoker 1 PPD for 30 years lung cancer screening -   Mammogram - declined today will consider in the future   Colon screening: positive cologuard 9/29/2020 - declined referral for colonoscopy at that time- declined today will consider in the future   DEXA: 3/25/23  Osteoporotic with high fracture risk, encouraged to take Fosomax 70 mg weekly, calcium 500 mg 2 times a day and 2000 units of vitamin D - she did not follow up to get Fosomax refilled - plan to update DEXA, she continues with calcium and vitamin D     Smoking cessation - tried patches and gum in the past did not tolerate     Pain History:  When did you first notice your pain? On going 2020 l4-l5 - mid back 3/23   Have you seen this provider for your pain in the past? No   Where in your body do your have pain? Upper back pain to mid back   Are you seeing anyone else for your pain? No  What makes your pain better? none  What makes your pain worse? movement  How has pain affected your ability to work? Not applicable                6/26/2018     2:00 PM 7/24/2020     1:00 PM 5/17/2023    10:16 AM   CASSIDY-7 SCORE   Total Score 0 5 18         5/17/2023    10:16 AM   PEG Score   PEG Total Score 9       Chronic Pain - Initial Assessment:    How would you describe your pain? Feels like it is swollen or stabling pain mid back   Have you had any recent changes to the severity or character of your pain? Progressively getting worse  Is there an underlying cause that has been identified? Unknown   Has your ability to work or do daily activities changed recently because of your pain? Yes, increased pain with activity   Which of these pain treatments have you tried? 800 mg of ibuprofen and tylenol - takes the edge of briefly   Previous medication treatments:  Not this part of her back         Review of Systems   CONSTITUTIONAL: NEGATIVE for fever, chills, change in weight  INTEGUMENTARY/SKIN: NEGATIVE for worrisome rashes, moles or lesions  EYES: NEGATIVE for vision changes or irritation and wears glasses   ENT/MOUTH: NEGATIVE for ear, mouth and throat problems  RESP:smoker FUNG  CV: NEGATIVE for chest pain, palpitations or peripheral edema  GI: NEGATIVE for nausea, abdominal pain, heartburn, or change in bowel habits  : denies  dysuria  MUSCULOSKELETAL: mid to upper back pain   NEURO: NEGATIVE for weakness, dizziness or paresthesias  ENDOCRINE: NEGATIVE for temperature intolerance, skin/hair changes  PSYCHIATRIC: HX depression      Objective    /82   Pulse 106   Temp 97.2  F (36.2  C) (Tympanic)   Wt 94.3 kg (208 lb)   SpO2 94%   BMI 33.57 kg/m    Body mass index is 33.57 kg/m .  Physical Exam   GENERAL: alert and obese  EYES: Eyes grossly normal to inspection, PERRL and conjunctivae and sclerae normal  HENT: ear canals and TM's normal, nose and mouth without ulcers or lesions  NECK: no adenopathy, no asymmetry, masses, or scars and thyroid normal to palpation  RESP: intermittent wheezing   CV: regular rate and rhythm, normal S1 S2, no S3 or S4, no murmur, click or rub, no peripheral edema and peripheral pulses strong  ABDOMEN: soft, nontender, no hepatosplenomegaly, no masses and bowel sounds normal  MS: tenderness to palpation across thoracic back and thoracic spine  SKIN: no suspicious lesions or rashes  NEURO: sensory exam grossly normal, mentation intact and pain to mid back with any movement   PSYCH: mentation appears normal and anxious  LYMPH: normal ant/post cervical, supraclavicular nodes    Results for orders placed or performed in visit on 05/17/23   XR THORACIC SPINE 3 VW (Clinic Performed)     Status: None    Narrative    Exam: XR THORACIC SPINE 3 VIEWS     History:Female, age 67 years, Midline thoracic back pain, unspecified  chronicity    Comparison:  Chest x-ray 5/8/2023    Technique: Two views are submitted    Findings: Bones are normally mineralized. No evidence of acute or  subacute fracture.  Vertebral bodies and posterior elements are well  aligned.           Impression    Impression:  No evidence of acute or subacute bony abnormality.     Mild/moderate multilevel degenerative change.    ANTONIO PATINO MD         SYSTEM ID:  [id number]   Results for orders placed or performed in visit on 05/17/23    Comprehensive metabolic panel (BMP + Alb, Alk Phos, ALT, AST, Total. Bili, TP)     Status: Abnormal   Result Value Ref Range    Sodium 138 136 - 145 mmol/L    Potassium 4.1 3.4 - 5.3 mmol/L    Chloride 101 98 - 107 mmol/L    Carbon Dioxide (CO2) 32 (H) 22 - 29 mmol/L    Anion Gap 5 (L) 7 - 15 mmol/L    Urea Nitrogen 32.7 (H) 8.0 - 23.0 mg/dL    Creatinine 0.66 0.51 - 0.95 mg/dL    Calcium 9.2 8.8 - 10.2 mg/dL    Glucose 96 70 - 99 mg/dL    Alkaline Phosphatase 58 35 - 104 U/L    AST 25 10 - 35 U/L    ALT 32 10 - 35 U/L    Protein Total 6.8 6.4 - 8.3 g/dL    Albumin 3.9 3.5 - 5.2 g/dL    Bilirubin Total 0.4 <=1.2 mg/dL    GFR Estimate >90 >60 mL/min/1.73m2   Lipid Profile (Chol, Trig, HDL, LDL calc)     Status: Abnormal   Result Value Ref Range    Cholesterol 182 <200 mg/dL    Triglycerides 99 <150 mg/dL    Direct Measure HDL 53 >=50 mg/dL    LDL Cholesterol Calculated 109 (H) <=100 mg/dL    Non HDL Cholesterol 129 <130 mg/dL    Narrative    Cholesterol  Desirable:  <200 mg/dL    Triglycerides  Normal:  Less than 150 mg/dL  Borderline High:  150-199 mg/dL  High:  200-499 mg/dL  Very High:  Greater than or equal to 500 mg/dL    Direct Measure HDL  Female:  Greater than or equal to 50 mg/dL   Male:  Greater than or equal to 40 mg/dL    LDL Cholesterol  Desirable:  <100mg/dL  Above Desirable:  100-129 mg/dL   Borderline High:  130-159 mg/dL   High:  160-189 mg/dL   Very High:  >= 190 mg/dL    Non HDL Cholesterol  Desirable:  130 mg/dL  Above Desirable:  130-159 mg/dL  Borderline High:  160-189 mg/dL  High:  190-219 mg/dL  Very High:  Greater than or equal to 220 mg/dL                   Lung Cancer Screening Shared Decision Making Visit     Corinne S Eden, a 67 year old female, is eligible for lung cancer screening    History   Smoking Status     Every Day     Packs/day: 1.00     Years: 30.00     Types: Cigarettes   Smokeless Tobacco     Never       I have discussed with patient the risks and benefits of screening for  lung cancer with low-dose CT.     The risks include:    radiation exposure: one low dose chest CT has as much ionizing radiation as about 15 chest x-rays, or 6 months of background radiation living in [place]      false positives: most findings/nodules are NOT cancer, but some might still require additional diagnostic evaluation, including biopsy    over-diagnosis: some slow growing cancers that might never have been clinically significant will be detected and treated unnecessarily     The benefit of early detection of lung cancer is contingent upon adherence to annual screening or more frequent follow up if indicated.     Furthermore, to benefit from screening, Corinne must be willing and able to undergo diagnostic procedures, if indicated. Although no specific guide is available for determining severity of comorbidities, it is reasonable to withhold screening in patients who have greater mortality risk from other diseases.     We did discuss that the best way to prevent lung cancer is to not smoke.    Some patients may value a numeric estimation of lung cancer risk when evaluating if lung cancer screening is right for them, here is one calculator:    ShouldIScreen

## 2023-05-18 LAB
DEPRECATED CALCIDIOL+CALCIFEROL SERPL-MC: 30 UG/L (ref 20–75)
HCV AB SERPL QL IA: NONREACTIVE
HIV 1+2 AB+HIV1 P24 AG SERPL QL IA: NONREACTIVE

## 2023-05-24 ENCOUNTER — VIRTUAL VISIT (OUTPATIENT)
Dept: FAMILY MEDICINE | Facility: OTHER | Age: 68
End: 2023-05-24
Attending: NURSE PRACTITIONER
Payer: MEDICARE

## 2023-05-24 ENCOUNTER — HOSPITAL ENCOUNTER (OUTPATIENT)
Dept: CT IMAGING | Facility: HOSPITAL | Age: 68
Discharge: HOME | End: 2023-05-24
Attending: NURSE PRACTITIONER
Payer: MEDICARE

## 2023-05-24 ENCOUNTER — HOSPITAL ENCOUNTER (OUTPATIENT)
Dept: BONE DENSITY | Facility: HOSPITAL | Age: 68
Discharge: HOME | End: 2023-05-24
Attending: NURSE PRACTITIONER
Payer: MEDICARE

## 2023-05-24 DIAGNOSIS — S22.050D COMPRESSION FRACTURE OF T5 VERTEBRA WITH ROUTINE HEALING, SUBSEQUENT ENCOUNTER: ICD-10-CM

## 2023-05-24 DIAGNOSIS — M81.6 LOCALIZED OSTEOPOROSIS WITHOUT CURRENT PATHOLOGICAL FRACTURE: Primary | ICD-10-CM

## 2023-05-24 DIAGNOSIS — Z87.891 PERSONAL HISTORY OF TOBACCO USE: ICD-10-CM

## 2023-05-24 DIAGNOSIS — R06.02 SHORTNESS OF BREATH: ICD-10-CM

## 2023-05-24 DIAGNOSIS — M81.6 LOCALIZED OSTEOPOROSIS WITHOUT CURRENT PATHOLOGICAL FRACTURE: ICD-10-CM

## 2023-05-24 PROCEDURE — 77080 DXA BONE DENSITY AXIAL: CPT

## 2023-05-24 PROCEDURE — 71271 CT THORAX LUNG CANCER SCR C-: CPT

## 2023-05-24 PROCEDURE — 99213 OFFICE O/P EST LOW 20 MIN: CPT | Mod: 93 | Performed by: NURSE PRACTITIONER

## 2023-05-24 RX ORDER — ALENDRONATE SODIUM 70 MG/1
70 TABLET ORAL
Qty: 12 TABLET | Refills: 3 | Status: SHIPPED | OUTPATIENT
Start: 2023-05-24 | End: 2024-04-08

## 2023-05-24 RX ORDER — ALBUTEROL SULFATE 0.83 MG/ML
SOLUTION RESPIRATORY (INHALATION)
Qty: 150 ML | Refills: 1 | Status: SHIPPED | OUTPATIENT
Start: 2023-05-24 | End: 2023-06-14

## 2023-05-24 RX ORDER — NAPROXEN 500 MG/1
500 TABLET ORAL 2 TIMES DAILY WITH MEALS
Qty: 30 TABLET | Refills: 1 | Status: SHIPPED | OUTPATIENT
Start: 2023-05-24 | End: 2024-05-13

## 2023-05-24 ASSESSMENT — PATIENT HEALTH QUESTIONNAIRE - PHQ9: SUM OF ALL RESPONSES TO PHQ QUESTIONS 1-9: 11

## 2023-05-24 NOTE — PATIENT INSTRUCTIONS
Naproxen as needed  Ok to continue muscle relaxants   Lidoderm - patches    MRI and IR consult for treatment plan for pain

## 2023-05-24 NOTE — PROGRESS NOTES
Corinne is a 67 year old who is being evaluated via a billable telephone visit.      What phone number would you like to be contacted at? [phone]  How would you like to obtain your AVS? Kori  Distant Location (provider location):  On-site    Assessment & Plan     Localized osteoporosis without current pathological fracture  Restart Fosamax - she tolerated in the past, she just stopped taking it   Continue with calcium and vitamin D   - alendronate (FOSAMAX) 70 MG tablet; Take 1 tablet (70 mg) by mouth every 7 days    Compression fracture of T5 vertebra with routine healing, subsequent encounter  Noted compression fracture on CT scan - hold off on PT for now  Referral for mri and ir consult  She is encouraged to treat her osteoporosis and also use calcium with vitamin D   She can try using Naproxen as needed and Lidoderm patches   - MR Thoracic Spine w/o Contrast; Future  - IR Consultation for IR Exam (Pain Consult); Future  - naproxen (NAPROSYN) 500 MG tablet; Take 1 tablet (500 mg) by mouth 2 times daily (with meals)    Shortness of breath  Refilled   - albuterol (PROVENTIL) (2.5 MG/3ML) 0.083% neb solution; USE 1 VIAL IN NEBULIZER EVERY 6 HOURS AS NEEDED FOR SHORTNESS OF BREATH DYSPNEA  OR  WHEEZING             See Patient Instructions    No follow-ups on file.    DEVORAH [facility]acios [facility] - [place]    Subjective   Corinne is a 67 year old, presenting for the following health issues:  RECHECK         View : No data to display.              HPI       Discuss Dexa results.  Osteoporosis  History of fosamax used only short term.    Calcium with vitamin D most days     Low dose Lung CT results  Compression fracture T5 noted on lung CT   No lung cancer noted     Review of Systems   CONSTITUTIONAL: NEGATIVE for fever, chills, change in weight  INTEGUMENTARY/SKIN: NEGATIVE for worrisome rashes, moles or lesions  RESP: NEGATIVE for significant cough or SOB  CV: NEGATIVE for chest  pain, palpitations or peripheral edema  MUSCULOSKELETAL: mid back pain       Objective    Vitals - Patient Reported  Pain Score: Severe Pain (7)  Pain Loc: Mid Back (Mid to upper back)      Vitals:  No vitals were obtained today due to virtual visit.    Physical Exam   alert and no distress  PSYCH: Alert and oriented times 3; coherent speech, normal   rate and volume, able to articulate logical thoughts, able   to abstract reason, no tangential thoughts, no hallucinations   or delusions  Her affect is normal and pleasant  RESP: No cough, no audible wheezing, able to talk in full sentences  Remainder of exam unable to be completed due to telephone visits    Results for orders placed or performed during the hospital encounter of 23   DX Hip/Pelvis/Spine     Status: None    Narrative    PROCEDURE: DX HIP/PELVIS/SPINE 2023 11:25 AM    HISTORY: Localized osteoporosis without current pathological fracture    COMPARISONS: 3/25/2021.    TECHNIQUE: Bone mineral density was measured in the left hip and in  the lumbar spine.    FINDINGS: Lowest bone mineral density is in the left hip for the T  score is -2.9. This patient is osteoporotic. This represents a 2.0%  decrease in bone mineral density. This is not statistically  significant.         Impression    IMPRESSION: Osteoporosis.    HEIDI NIX MD         SYSTEM ID:  [id number]   Results for orders placed or performed during the hospital encounter of 23   CT Chest Lung Cancer Scrn Low Dose wo     Status: None    Narrative    CT Low Dose Lung Cancer Screening    History: Screening for lung cancer, smoking.  Patient age:67 years  Gender:Female  Number of packs-year of smokin  Current or Former smoker?: Current  if former, number of years since quit?:    Comparison:    Technique: Helical acquisition Low dose CT chest. Images reviewed in  lung, soft tissue and bone windows.  DLP: 97 (mGy*cm)  CTDIvol: 2.7 L    Findings:       There is no mediastinal  "or hilar or axillary lymphadenopathy.    The lungs are clear. No consolidating airspace opacities are present.  No concerning pulmonary nodules or masses are present         No concerning osseous lesions are identified. There is osteopenia.  There is a compression fracture deformity of T5 of moderate severity.  There is an incompletely healed nondisplaced fracture of the right  lateral sixth rib.            Impression    Impression:   1. ACR Assessment Category:  Lung-RADS Category 1. Negative .     Recommendation:  Lung-RADS Category 1. Negative, continue annual  screening with Lung cancer screening CT (please order exam code IMG  2290) .         2. Significant Incidental Finding(s):  Category S: No.  a.  b.  c.  d.  e.  3. Prior history of Lung cancer: .  4. (Any moderate or severe Emphysema or bronchial wall thickening or  mosaic attenuation?)  5. Avoidance of tobacco smoke is strongly advised. Please consider  referral for smoking cessation to [facility]  (MTM) if clinically appropriate.      Download the \"LungRADS Assessment Categories\" table at this site:   http://www.acr.org/Quality-Safety/Resources/LungRADS    BERTO IRWIN MD         SYSTEM ID:  [id number]                 Phone call duration: 10 minutes    "

## 2023-05-25 ENCOUNTER — HOSPITAL ENCOUNTER (OUTPATIENT)
Dept: MRI IMAGING | Facility: HOSPITAL | Age: 68
Discharge: HOME | End: 2023-05-25
Attending: NURSE PRACTITIONER | Admitting: NURSE PRACTITIONER
Payer: MEDICARE

## 2023-05-25 DIAGNOSIS — S22.050D COMPRESSION FRACTURE OF T5 VERTEBRA WITH ROUTINE HEALING, SUBSEQUENT ENCOUNTER: ICD-10-CM

## 2023-05-25 PROCEDURE — G1010 CDSM STANSON: HCPCS

## 2023-05-31 ENCOUNTER — TELEPHONE (OUTPATIENT)
Dept: INTERVENTIONAL RADIOLOGY/VASCULAR | Facility: HOSPITAL | Age: 68
End: 2023-05-31

## 2023-06-02 ENCOUNTER — HOSPITAL ENCOUNTER (OUTPATIENT)
Dept: INTERVENTIONAL RADIOLOGY/VASCULAR | Facility: HOSPITAL | Age: 68
Discharge: HOME | End: 2023-06-02
Attending: NURSE PRACTITIONER | Admitting: RADIOLOGY
Payer: MEDICARE

## 2023-06-02 ENCOUNTER — HOSPITAL ENCOUNTER (OUTPATIENT)
Facility: HOSPITAL | Age: 68
Discharge: HOME | End: 2023-06-02
Attending: RADIOLOGY | Admitting: RADIOLOGY
Payer: MEDICARE

## 2023-06-02 ENCOUNTER — APPOINTMENT (OUTPATIENT)
Dept: INTERVENTIONAL RADIOLOGY/VASCULAR | Facility: HOSPITAL | Age: 68
End: 2023-06-02
Attending: NURSE PRACTITIONER
Payer: MEDICARE

## 2023-06-02 DIAGNOSIS — S22.000A COMPRESSION FX, THORACIC SPINE (H): ICD-10-CM

## 2023-06-02 DIAGNOSIS — Z00.00 PHYSICAL EXAM: ICD-10-CM

## 2023-06-02 DIAGNOSIS — S22.050D COMPRESSION FRACTURE OF T5 VERTEBRA WITH ROUTINE HEALING, SUBSEQUENT ENCOUNTER: ICD-10-CM

## 2023-06-02 PROCEDURE — G0463 HOSPITAL OUTPT CLINIC VISIT: HCPCS

## 2023-06-02 PROCEDURE — 76000 FLUOROSCOPY <1 HR PHYS/QHP: CPT

## 2023-06-02 ASSESSMENT — ACTIVITIES OF DAILY LIVING (ADL)
ADLS_ACUITY_SCORE: 35
ADLS_ACUITY_SCORE: 35

## 2023-06-14 ENCOUNTER — OFFICE VISIT (OUTPATIENT)
Dept: FAMILY MEDICINE | Facility: OTHER | Age: 68
End: 2023-06-14
Attending: NURSE PRACTITIONER
Payer: COMMERCIAL

## 2023-06-14 VITALS
SYSTOLIC BLOOD PRESSURE: 128 MMHG | DIASTOLIC BLOOD PRESSURE: 80 MMHG | BODY MASS INDEX: 32.6 KG/M2 | HEART RATE: 80 BPM | TEMPERATURE: 97.4 F | WEIGHT: 202 LBS | OXYGEN SATURATION: 95 %

## 2023-06-14 DIAGNOSIS — R06.02 SHORTNESS OF BREATH: ICD-10-CM

## 2023-06-14 DIAGNOSIS — M54.6 MIDLINE THORACIC BACK PAIN, UNSPECIFIED CHRONICITY: Primary | ICD-10-CM

## 2023-06-14 PROCEDURE — G0463 HOSPITAL OUTPT CLINIC VISIT: HCPCS

## 2023-06-14 PROCEDURE — 99213 OFFICE O/P EST LOW 20 MIN: CPT | Performed by: NURSE PRACTITIONER

## 2023-06-14 RX ORDER — ALBUTEROL SULFATE 0.83 MG/ML
SOLUTION RESPIRATORY (INHALATION)
Qty: 150 ML | Refills: 3 | Status: SHIPPED | OUTPATIENT
Start: 2023-06-14 | End: 2023-12-28

## 2023-06-14 NOTE — PROGRESS NOTES
Assessment & Plan     Shortness of breath  Refilled   - albuterol (PROVENTIL) (2.5 MG/3ML) 0.083% neb solution; USE 1 VIAL IN NEBULIZER EVERY 6 HOURS AS NEEDED FOR SHORTNESS OF BREATH DYSPNEA  OR  WHEEZING    Midline thoracic back pain, unspecified chronicity  Continues to improve - no concerning symptoms    Schedule a physical this year - when ready   Consider Mammogram   I spent a total of 26 minutes on the day of the visit.   Time spent by me doing chart review, history and exam, documentation and further activities per the note       See Patient Instructions    No follow-ups on file.    Sandra Zamora, DEVORAH [facility] [place] Subjective Corinne is a 67 year old, presenting for the following health issues:  Back Pain (Chronic pain)    HPI     Chronic/Recurring Back Pain Follow Up      Where is your back pain located? (Select all that apply) upper back both    How would you describe your back pain?  dull ache    Where does your back pain spread? nowhere    Since your last clinic visit for back pain, how has your pain changed? gradually improving    Does your back pain interfere with your job? Not applicable    Since your last visit, have you tried any new treatment? No     Resent compression deformity/fracture of T5 - she declined Kyphoplasty, but did received nerve block for rib/sternal pain from IR radiology     Started fosamax for osteoperosis     Pain is improving         Review of Systems   CONSTITUTIONAL: NEGATIVE for fever, chills, change in weight  INTEGUMENTARY/SKIN: NEGATIVE for worrisome rashes, moles or lesions  RESP: NEGATIVE for significant cough or SOB  CV: NEGATIVE for chest pain, palpitations or peripheral edema  MUSCULOSKELETAL: thoracic back pain - improving   NEURO: NEGATIVE for weakness, dizziness or paresthesias      Objective    /80   Pulse 80   Temp 97.4  F (36.3  C) (Tympanic)   Wt 91.6 kg (202 lb)   SpO2 95%   BMI 32.60 kg/m    Body mass index  is 32.6 kg/m .  Physical Exam   GENERAL: alert and no distress  RESP: lungs clear to auscultation - no rales, rhonchi or wheezes  CV: regular rate and rhythm, normal S1 S2, no S3 or S4, no murmur, click or rub, no peripheral edema and peripheral pulses strong  MS: tenderness to palpation left lateral back, negative for pain on spine     Office Visit on 05/17/2023   Component Date Value Ref Range Status     Vitamin D, Total (25-Hydroxy) 05/17/2023 30  20 - 75 ug/L Final     Hepatitis C Antibody 05/17/2023 Nonreactive  Nonreactive Final     HIV Antigen Antibody Combo 05/17/2023 Nonreactive  Nonreactive Final    HIV-1 p24 Ag & HIV-1/HIV-2 Ab Not Detected     Sodium 05/17/2023 138  136 - 145 mmol/L Final     Potassium 05/17/2023 4.1  3.4 - 5.3 mmol/L Final     Chloride 05/17/2023 101  98 - 107 mmol/L Final     Carbon Dioxide (CO2) 05/17/2023 32 (H)  22 - 29 mmol/L Final     Anion Gap 05/17/2023 5 (L)  7 - 15 mmol/L Final     Urea Nitrogen 05/17/2023 32.7 (H)  8.0 - 23.0 mg/dL Final     Creatinine 05/17/2023 0.66  0.51 - 0.95 mg/dL Final     Calcium 05/17/2023 9.2  8.8 - 10.2 mg/dL Final     Glucose 05/17/2023 96  70 - 99 mg/dL Final     Alkaline Phosphatase 05/17/2023 58  35 - 104 U/L Final     AST 05/17/2023 25  10 - 35 U/L Final     ALT 05/17/2023 32  10 - 35 U/L Final     Protein Total 05/17/2023 6.8  6.4 - 8.3 g/dL Final     Albumin 05/17/2023 3.9  3.5 - 5.2 g/dL Final     Bilirubin Total 05/17/2023 0.4  <=1.2 mg/dL Final     GFR Estimate 05/17/2023 >90  >60 mL/min/1.73m2 Final    eGFR calculated using 2021 CKD-EPI equation.     Cholesterol 05/17/2023 182  <200 mg/dL Final     Triglycerides 05/17/2023 99  <150 mg/dL Final     Direct Measure HDL 05/17/2023 53  >=50 mg/dL Final     LDL Cholesterol Calculated 05/17/2023 109 (H)  <=100 mg/dL Final     Non HDL Cholesterol 05/17/2023 129  <130 mg/dL Final

## 2023-12-28 DIAGNOSIS — R06.02 SHORTNESS OF BREATH: ICD-10-CM

## 2023-12-28 NOTE — TELEPHONE ENCOUNTER
Albuterol 0.083% 3 ML neb      Last Written Prescription Date:  06/14/2023  Last Fill Quantity: 150 ml,   # refills: 3  Last Office Visit: 06/14/2023  Future Office visit:       Routing refill request to provider for review/approval because:

## 2023-12-29 RX ORDER — ALBUTEROL SULFATE 0.83 MG/ML
SOLUTION RESPIRATORY (INHALATION)
Qty: 150 ML | Refills: 3 | Status: SHIPPED | OUTPATIENT
Start: 2023-12-29 | End: 2024-05-13

## 2024-02-05 ENCOUNTER — HOSPITAL ENCOUNTER (EMERGENCY)
Facility: HOSPITAL | Age: 69
Discharge: HOME | End: 2024-02-05
Attending: NURSE PRACTITIONER | Admitting: NURSE PRACTITIONER
Payer: MEDICARE

## 2024-02-05 VITALS
HEART RATE: 103 BPM | OXYGEN SATURATION: 95 % | TEMPERATURE: 97.9 F | RESPIRATION RATE: 18 BRPM | DIASTOLIC BLOOD PRESSURE: 94 MMHG | SYSTOLIC BLOOD PRESSURE: 160 MMHG

## 2024-02-05 DIAGNOSIS — R59.9 SWOLLEN LYMPH NODES: Primary | ICD-10-CM

## 2024-02-05 LAB — GROUP A STREP BY PCR: NOT DETECTED

## 2024-02-05 PROCEDURE — 87651 STREP A DNA AMP PROBE: CPT | Performed by: NURSE PRACTITIONER

## 2024-02-05 PROCEDURE — G0463 HOSPITAL OUTPT CLINIC VISIT: HCPCS

## 2024-02-05 PROCEDURE — 99213 OFFICE O/P EST LOW 20 MIN: CPT | Performed by: NURSE PRACTITIONER

## 2024-02-05 ASSESSMENT — ENCOUNTER SYMPTOMS
SORE THROAT: 0
SHORTNESS OF BREATH: 0
FEVER: 0
TROUBLE SWALLOWING: 0
SINUS PAIN: 0
NECK PAIN: 1
CHILLS: 0
VOICE CHANGE: 0
SINUS PRESSURE: 0
COUGH: 0

## 2024-02-05 ASSESSMENT — ACTIVITIES OF DAILY LIVING (ADL): ADLS_ACUITY_SCORE: 35

## 2024-02-05 NOTE — ED TRIAGE NOTES
Pt presents with c/o having increased sinus pain and pressure that has been intermittent over the last few weeks.   Pt reports has had an increased intermittent sore throat   No otc meds taken today

## 2024-02-05 NOTE — DISCHARGE INSTRUCTIONS
You have a swollen lymph node.  As discussed we are going to do a strep test.  We will notify you of the result when available.    Take Tylenol or ibuprofen as needed for pain.  Apply warm compresses to the affected area for 15 minutes on and off.    Follow-up with your doctor if no improvement in symptoms.    Return to urgent care or emergency room for any worsening or concerning symptoms.

## 2024-02-05 NOTE — ED PROVIDER NOTES
History     Chief Complaint   Patient presents with    Sinusitis     HPI  Corinne S Eden is a 68 year old female who presents ambulatory to urgent care with concerns of swelling neck glands.  Patient tells me that she has been dealing with a sinus infection for about 3 weeks.  Her symptoms finally cleared up.  Last night she started feeling some swelling of her neck lymph nodes and became concerned so she presented here for evaluation.  She denies a significant sore throat.  No fevers or chills.  No cough, chest pain or trouble breathing.  She is still eating and drinking okay.  Swallowing with minimal difficulty.  She does take care of her brother's kids and just wants to make sure she has nothing that she will pass on to them.    Allergies:  Allergies   Allergen Reactions    Pcn [Penicillins]        Problem List:    Patient Active Problem List    Diagnosis Date Noted    Abnormal mammogram 2021     Priority: Medium     right  Patient declining follow up testing      Acute bilateral low back pain with right-sided sciatica 2020     Priority: Medium    LPRD (laryngopharyngeal reflux disease) 05/15/2018     Priority: Medium    Tobacco use 05/15/2018     Priority: Medium    Tobacco abuse counseling 2017     Priority: Medium    Closed compression fracture of L4 lumbar vertebra, with routine healing, subsequent encounter 2017     Priority: Medium    Closed fracture of ankle 2006     Priority: Medium     Overview:   IMO Update 10/11          Past Medical History:    History reviewed. No pertinent past medical history.    Past Surgical History:    Past Surgical History:   Procedure Laterality Date    ------------OTHER-------------      remove polyps from vocal cords     SECTION      son    FRACTURE TX, ANKLE RT/LT Right     surgery x 2    IR CONSULTATION FOR IR EXAM  2023    IR FLUORO 0-1 HOUR  2023       Family History:    Family History   Problem Relation Age of  Onset    Coronary Artery Disease Mother 73    Myocardial Infarction Mother     Diabetes Brother     Lung Cancer Father 66    Diabetes Maternal Grandmother     Hypertension No family hx of     Hyperlipidemia No family hx of     Breast Cancer No family hx of     Colon Cancer No family hx of     Asthma No family hx of     Thyroid Disease No family hx of        Social History:  Marital Status:  Single [1]  Social History     Tobacco Use    Smoking status: Every Day     Packs/day: 1.00     Years: 30.00     Additional pack years: 0.00     Total pack years: 30.00     Types: Cigarettes    Smokeless tobacco: Never   Vaping Use    Vaping Use: Never used   Substance Use Topics    Alcohol use: Yes     Comment: rarely    Drug use: No        Medications:    albuterol (PROAIR HFA/PROVENTIL HFA/VENTOLIN HFA) 108 (90 Base) MCG/ACT inhaler  albuterol (PROVENTIL) (2.5 MG/3ML) 0.083% neb solution  alendronate (FOSAMAX) 70 MG tablet  calcium-vitamin D (CALTRATE) 600-400 MG-UNIT per tablet  methocarbamol (ROBAXIN) 500 MG tablet  naproxen (NAPROSYN) 500 MG tablet          Review of Systems   Constitutional:  Negative for chills and fever.   HENT:  Negative for sinus pressure, sinus pain, sore throat, trouble swallowing and voice change.    Respiratory:  Negative for cough and shortness of breath.    Musculoskeletal:  Positive for neck pain.   All other systems reviewed and are negative.      Physical Exam   BP: 160/94  Pulse: 103  Temp: 97.9  F (36.6  C)  Resp: 18  SpO2: 95 %      Physical Exam  Vitals and nursing note reviewed.   Constitutional:       Appearance: Normal appearance. She is not ill-appearing or toxic-appearing.   HENT:      Head: Atraumatic.      Jaw: No trismus, tenderness or pain on movement.      Right Ear: Tympanic membrane and ear canal normal.      Left Ear: Tympanic membrane and ear canal normal.      Mouth/Throat:      Dentition: Has dentures. No gingival swelling.      Pharynx: Oropharynx is clear. Uvula midline.  Posterior oropharyngeal erythema (Mild) present. No pharyngeal swelling or oropharyngeal exudate.      Tonsils: No tonsillar exudate or tonsillar abscesses. 0 on the right. 0 on the left.   Eyes:      Pupils: Pupils are equal, round, and reactive to light.   Neck:     Cardiovascular:      Rate and Rhythm: Normal rate and regular rhythm.      Heart sounds: Normal heart sounds.   Pulmonary:      Effort: Pulmonary effort is normal. No respiratory distress.      Breath sounds: No stridor. Wheezing (Faint) present. No rhonchi or rales.   Musculoskeletal:      Cervical back: Neck supple.   Lymphadenopathy:      Head:      Right side of head: Submental adenopathy present.   Skin:     General: Skin is warm and dry.   Neurological:      Mental Status: She is alert and oriented to person, place, and time.         ED Course                 Procedures              No results found for this or any previous visit (from the past 24 hour(s)).    Medications - No data to display    Assessments & Plan (with Medical Decision Making)   68-year-old female that presented for evaluation of swollen neck lymph nodes that she first noticed yesterday.  No sore throat.  No fevers.  Recently had a sinus infection.  She does have a right-sided submental swollen lymph node that is slightly tender to the touch.  There are some redness noted to this area not significant for skin infection.  The patient also notes that he has she has been touching and rubbing the area.  She does have mild erythema to her posterior pharynx.  She is afebrile.  Pleasantly talkative.  No apparent respiratory distress.  No oral swelling appreciated.    Denies any dental pain. She does have dentures.    Discussed all findings with patient.  Patient will be tested for strep throat.  She declined waiting for the results.  She will be called and notified of result when available.  Swollen lymph nodes likely due to her recent sinus infection.  In the meantime I recommended  Tylenol ibuprofen as needed for pain.  Apply warm compresses over the area.  If pain and symptoms persist or worsen, patient was advised to return to urgent care or emergency department for any worsening or concerning symptoms.      I have reviewed the nursing notes.    I have reviewed the findings, diagnosis, plan and need for follow up with the patient.  This document was prepared using a combination of typing and voice generated software.  While every attempt was made for accuracy, spelling and grammatical errors may exist.         New Prescriptions    No medications on file       Final diagnoses:   Swollen lymph nodes       2/5/2024   [facility]       Mpofu, Prudence, CNP  02/05/24 1534

## 2024-04-08 DIAGNOSIS — M81.6 LOCALIZED OSTEOPOROSIS WITHOUT CURRENT PATHOLOGICAL FRACTURE: ICD-10-CM

## 2024-04-08 RX ORDER — ALENDRONATE SODIUM 70 MG/1
70 TABLET ORAL WEEKLY
Qty: 12 TABLET | Refills: 0 | Status: SHIPPED | OUTPATIENT
Start: 2024-04-08 | End: 2024-07-01

## 2024-04-08 NOTE — TELEPHONE ENCOUNTER
Fosamax      Last Written Prescription Date:  05/24/23  Last Fill Quantity: 12,   # refills: 3  Last Office Visit: 06/14/23  Future Office visit:

## 2024-05-13 ENCOUNTER — TELEPHONE (OUTPATIENT)
Dept: FAMILY MEDICINE | Facility: OTHER | Age: 69
End: 2024-05-13

## 2024-05-13 ENCOUNTER — OFFICE VISIT (OUTPATIENT)
Dept: FAMILY MEDICINE | Facility: OTHER | Age: 69
End: 2024-05-13
Attending: NURSE PRACTITIONER
Payer: COMMERCIAL

## 2024-05-13 VITALS
RESPIRATION RATE: 18 BRPM | BODY MASS INDEX: 36.99 KG/M2 | HEIGHT: 65 IN | OXYGEN SATURATION: 98 % | DIASTOLIC BLOOD PRESSURE: 80 MMHG | WEIGHT: 222 LBS | HEART RATE: 100 BPM | SYSTOLIC BLOOD PRESSURE: 130 MMHG

## 2024-05-13 DIAGNOSIS — Z00.00 ENCOUNTER FOR MEDICARE ANNUAL WELLNESS EXAM: Primary | ICD-10-CM

## 2024-05-13 DIAGNOSIS — E66.01 CLASS 2 SEVERE OBESITY DUE TO EXCESS CALORIES WITH SERIOUS COMORBIDITY AND BODY MASS INDEX (BMI) OF 36.0 TO 36.9 IN ADULT (H): ICD-10-CM

## 2024-05-13 DIAGNOSIS — Z12.11 SPECIAL SCREENING FOR MALIGNANT NEOPLASMS, COLON: ICD-10-CM

## 2024-05-13 DIAGNOSIS — Z87.891 PERSONAL HISTORY OF TOBACCO USE: ICD-10-CM

## 2024-05-13 DIAGNOSIS — E66.812 CLASS 2 SEVERE OBESITY DUE TO EXCESS CALORIES WITH SERIOUS COMORBIDITY AND BODY MASS INDEX (BMI) OF 36.0 TO 36.9 IN ADULT (H): ICD-10-CM

## 2024-05-13 DIAGNOSIS — L40.9 PSORIASIS: ICD-10-CM

## 2024-05-13 DIAGNOSIS — J43.9 PULMONARY EMPHYSEMA, UNSPECIFIED EMPHYSEMA TYPE (H): ICD-10-CM

## 2024-05-13 DIAGNOSIS — Z12.31 VISIT FOR SCREENING MAMMOGRAM: ICD-10-CM

## 2024-05-13 DIAGNOSIS — E78.2 MIXED HYPERLIPIDEMIA: ICD-10-CM

## 2024-05-13 PROBLEM — J44.9 COPD (CHRONIC OBSTRUCTIVE PULMONARY DISEASE) (H): Status: ACTIVE | Noted: 2024-05-13

## 2024-05-13 LAB
ALBUMIN SERPL BCG-MCNC: 4 G/DL (ref 3.5–5.2)
ALP SERPL-CCNC: 68 U/L (ref 40–150)
ALT SERPL W P-5'-P-CCNC: 28 U/L (ref 0–50)
ANION GAP SERPL CALCULATED.3IONS-SCNC: 8 MMOL/L (ref 7–15)
AST SERPL W P-5'-P-CCNC: 21 U/L (ref 0–45)
BILIRUB SERPL-MCNC: 0.3 MG/DL
BUN SERPL-MCNC: 16.2 MG/DL (ref 8–23)
CALCIUM SERPL-MCNC: 8.9 MG/DL (ref 8.8–10.2)
CHLORIDE SERPL-SCNC: 104 MMOL/L (ref 98–107)
CHOLEST SERPL-MCNC: 210 MG/DL
CREAT SERPL-MCNC: 0.7 MG/DL (ref 0.51–0.95)
DEPRECATED HCO3 PLAS-SCNC: 27 MMOL/L (ref 22–29)
EGFRCR SERPLBLD CKD-EPI 2021: >90 ML/MIN/1.73M2
FASTING STATUS PATIENT QL REPORTED: NO
FASTING STATUS PATIENT QL REPORTED: NO
GLUCOSE SERPL-MCNC: 122 MG/DL (ref 70–99)
HDLC SERPL-MCNC: 47 MG/DL
LDLC SERPL CALC-MCNC: 139 MG/DL
NONHDLC SERPL-MCNC: 163 MG/DL
POTASSIUM SERPL-SCNC: 4.4 MMOL/L (ref 3.4–5.3)
PROT SERPL-MCNC: 7 G/DL (ref 6.4–8.3)
SODIUM SERPL-SCNC: 139 MMOL/L (ref 135–145)
TRIGL SERPL-MCNC: 118 MG/DL

## 2024-05-13 PROCEDURE — G0296 VISIT TO DETERM LDCT ELIG: HCPCS | Performed by: NURSE PRACTITIONER

## 2024-05-13 PROCEDURE — G0439 PPPS, SUBSEQ VISIT: HCPCS | Performed by: NURSE PRACTITIONER

## 2024-05-13 PROCEDURE — 80061 LIPID PANEL: CPT | Mod: ZL | Performed by: NURSE PRACTITIONER

## 2024-05-13 PROCEDURE — 36415 COLL VENOUS BLD VENIPUNCTURE: CPT | Mod: ZL | Performed by: NURSE PRACTITIONER

## 2024-05-13 PROCEDURE — 80053 COMPREHEN METABOLIC PANEL: CPT | Mod: ZL | Performed by: NURSE PRACTITIONER

## 2024-05-13 RX ORDER — ALBUTEROL SULFATE 90 UG/1
2 AEROSOL, METERED RESPIRATORY (INHALATION) EVERY 6 HOURS
Qty: 18 G | Refills: 3 | Status: SHIPPED | OUTPATIENT
Start: 2024-05-13

## 2024-05-13 RX ORDER — ALBUTEROL SULFATE 0.83 MG/ML
SOLUTION RESPIRATORY (INHALATION)
Qty: 150 ML | Refills: 3 | Status: SHIPPED | OUTPATIENT
Start: 2024-05-13 | End: 2024-09-13

## 2024-05-13 RX ORDER — ATORVASTATIN CALCIUM 20 MG/1
20 TABLET, FILM COATED ORAL DAILY
Qty: 90 TABLET | Refills: 3 | Status: SHIPPED | OUTPATIENT
Start: 2024-05-13

## 2024-05-13 RX ORDER — TRIAMCINOLONE ACETONIDE 1 MG/G
CREAM TOPICAL 2 TIMES DAILY
Qty: 80 G | Refills: 1 | Status: SHIPPED | OUTPATIENT
Start: 2024-05-13 | End: 2024-07-02

## 2024-05-13 SDOH — HEALTH STABILITY: PHYSICAL HEALTH: ON AVERAGE, HOW MANY DAYS PER WEEK DO YOU ENGAGE IN MODERATE TO STRENUOUS EXERCISE (LIKE A BRISK WALK)?: 2 DAYS

## 2024-05-13 SDOH — HEALTH STABILITY: PHYSICAL HEALTH: ON AVERAGE, HOW MANY MINUTES DO YOU ENGAGE IN EXERCISE AT THIS LEVEL?: 20 MIN

## 2024-05-13 ASSESSMENT — ANXIETY QUESTIONNAIRES
2. NOT BEING ABLE TO STOP OR CONTROL WORRYING: NOT AT ALL
7. FEELING AFRAID AS IF SOMETHING AWFUL MIGHT HAPPEN: NOT AT ALL
5. BEING SO RESTLESS THAT IT IS HARD TO SIT STILL: NOT AT ALL
4. TROUBLE RELAXING: SEVERAL DAYS
GAD7 TOTAL SCORE: 1
GAD7 TOTAL SCORE: 1
IF YOU CHECKED OFF ANY PROBLEMS ON THIS QUESTIONNAIRE, HOW DIFFICULT HAVE THESE PROBLEMS MADE IT FOR YOU TO DO YOUR WORK, TAKE CARE OF THINGS AT HOME, OR GET ALONG WITH OTHER PEOPLE: NOT DIFFICULT AT ALL
7. FEELING AFRAID AS IF SOMETHING AWFUL MIGHT HAPPEN: NOT AT ALL
6. BECOMING EASILY ANNOYED OR IRRITABLE: NOT AT ALL
8. IF YOU CHECKED OFF ANY PROBLEMS, HOW DIFFICULT HAVE THESE MADE IT FOR YOU TO DO YOUR WORK, TAKE CARE OF THINGS AT HOME, OR GET ALONG WITH OTHER PEOPLE?: NOT DIFFICULT AT ALL
3. WORRYING TOO MUCH ABOUT DIFFERENT THINGS: NOT AT ALL
GAD7 TOTAL SCORE: 1
1. FEELING NERVOUS, ANXIOUS, OR ON EDGE: NOT AT ALL

## 2024-05-13 ASSESSMENT — PATIENT HEALTH QUESTIONNAIRE - PHQ9
SUM OF ALL RESPONSES TO PHQ QUESTIONS 1-9: 0
10. IF YOU CHECKED OFF ANY PROBLEMS, HOW DIFFICULT HAVE THESE PROBLEMS MADE IT FOR YOU TO DO YOUR WORK, TAKE CARE OF THINGS AT HOME, OR GET ALONG WITH OTHER PEOPLE: NOT DIFFICULT AT ALL
SUM OF ALL RESPONSES TO PHQ QUESTIONS 1-9: 0

## 2024-05-13 ASSESSMENT — SOCIAL DETERMINANTS OF HEALTH (SDOH): HOW OFTEN DO YOU GET TOGETHER WITH FRIENDS OR RELATIVES?: MORE THAN THREE TIMES A WEEK

## 2024-05-13 ASSESSMENT — PAIN SCALES - GENERAL: PAINLEVEL: NO PAIN (0)

## 2024-05-13 NOTE — TELEPHONE ENCOUNTER
Received a PA request from [facility] for albuterol (PROVENTIL) (2.5 MG/3ML) 0.083% neb solution. Submitted on CMM. Waiting for a response.

## 2024-05-13 NOTE — PROGRESS NOTES
Preventive Care Visit  [facility]  DEVORAH Palacios CNP, Family Medicine  May 13, 2024      Assessment & Plan     Encounter for Medicare annual wellness exam  Continue yearly screening     Pulmonary emphysema, unspecified emphysema type (H)  Refilled inhaler and neb solution   - albuterol (PROAIR HFA/PROVENTIL HFA/VENTOLIN HFA) 108 (90 Base) MCG/ACT inhaler; Inhale 2 puffs into the lungs every 6 hours  - albuterol (PROVENTIL) (2.5 MG/3ML) 0.083% neb solution; USE 1 VIAL IN NEBULIZER EVERY 6 HOURS AS NEEDED FOR SHORTNESS OF BREATH DYSPNEA  OR  WHEEZING    Class 2 severe obesity due to excess calories with serious comorbidity and body mass index (BMI) of 36.0 to 36.9 in adult (H)  Corinne is working on diet and lifestyle changes   She would like to lose some weight to help with her back pain   - Comprehensive metabolic panel (BMP + Alb, Alk Phos, ALT, AST, Total. Bili, TP); Future  - Comprehensive metabolic panel (BMP + Alb, Alk Phos, ALT, AST, Total. Bili, TP)    Personal history of tobacco use  Corinne is aware she needs to stop smoking. She plans to stop if she is going to be a grandmother.  Her son and his wife are trying to have a baby   - Prof fee: Shared Decision Making for Lung Cancer Screening  - CT Chest Lung Cancer Scrn Low Dose wo; Future    Visit for screening mammogram  Declined for now, but will consider in the future     Special screening for malignant neoplasms, colon  History of positive cologuard - encouraged to have colonoscopy - she continues to decline may consider in the future - colon cancer is preventable with early intervention     Psoriasis  Psoriasis to both elbows comes and goes - recently increased and having little improvement with OTC treatment   - triamcinolone (KENALOG) 0.1 % external cream; Apply topically 2 times daily    Mixed hyperlipidemia  Elevated cholesterol and LDL and low HDL - we discussed need for statin and potential side effects such as muscle pain  "  Should recheck LIPIDs in 1-3 months   - Lipid Profile (Chol, Trig, HDL, LDL calc); Future  - Lipid Profile (Chol, Trig, HDL, LDL calc)  - atorvastatin (LIPITOR) 20 MG tablet; Take 1 tablet (20 mg) by mouth daily  The 10-year ASCVD risk score (Anne VASQUEZ, et al., 2019) is: 12.2%    Values used to calculate the score:      Age: 68 years      Sex: Female      Is Non- : No      Diabetic: No      Tobacco smoker: Yes      Systolic Blood Pressure: 130 mmHg      Is BP treated: No      HDL Cholesterol: 47 mg/dL      Total Cholesterol: 210 mg/dL      Patient has been advised of split billing requirements and indicates understanding: No  Ordering of each unique test        Nicotine/Tobacco Cessation  She reports that she has been smoking cigarettes. She has a 30 pack-year smoking history. She has been exposed to tobacco smoke. She has never used smokeless tobacco.  Nicotine/Tobacco Cessation Plan  Encouraged smoking cessation - she states she will stop when she is ready       BMI  Estimated body mass index is 36.94 kg/m  as calculated from the following:    Height as of this encounter: 1.651 m (5' 5\").    Weight as of this encounter: 100.7 kg (222 lb).   Weight management plan: Discussed healthy diet and exercise guidelines    Counseling  Appropriate preventive services were discussed with this patient, including applicable screening as appropriate for fall prevention, nutrition, physical activity, Tobacco-use cessation, weight loss and cognition.  Checklist reviewing preventive services available has been given to the patient.  Reviewed patient's diet, addressing concerns and/or questions.   She is at risk for lack of exercise and has been provided with information to increase physical activity for the benefit of her well-being.   The patient was instructed to see the dentist every 6 months.       See Patient Instructions    No follow-ups on file.    Subjective Corinne is a 68 year old, presenting " "for the following:  Wellness Visit        5/13/2024     9:47 AM   Additional Questions   Roomed by VLADIMIR Genao CMA   Accompanied by Self         5/13/2024     9:47 AM   Patient Reported Additional Medications   Patient reports taking the following new medications None         Health Care Directive  Patient does not have a Health Care Directive or Living Will: Discussed advance care planning with patient; however, patient declined at this time.    HPI    COPD Follow-Up  Overall, how are your COPD symptoms since your last clinic visit?  No change  How much fatigue or shortness of breath do you have when you are walking?  Same as usual  How much shortness of breath do you have when you are resting?  None  How often do you cough? Rarely  Have you noticed any change in your sputum/phlegm?  No  Have you experienced a recent fever? No  Please describe how far you can walk without stopping to rest:  2-5 blocks  How many flights of stairs are you able to walk up without stopping?  2  Have you had any Emergency Room Visits, Urgent Care Visits, or Hospital Admissions because of your COPD since your last office visit?  No    History   Smoking Status    Every Day    Types: Cigarettes   Smokeless Tobacco    Never     No results found for: \"FEV1\", \"VZA4NAA\"      5/13/2024   General Health   How would you rate your overall physical health? Good   Feel stress (tense, anxious, or unable to sleep) Not at all         5/13/2024   Nutrition   Diet: Regular (no restrictions)         5/13/2024   Exercise   Days per week of moderate/strenous exercise 2 days   Average minutes spent exercising at this level 20 min   (!) EXERCISE CONCERN      5/13/2024   Social Factors   Frequency of gathering with friends or relatives More than three times a week   Worry food won't last until get money to buy more No   Food not last or not have enough money for food? No   Do you have housing?  Yes   Are you worried about losing your housing? No   Lack of " transportation? No   Unable to get utilities (heat,electricity)? No         5/13/2024   Fall Risk   Fallen 2 or more times in the past year? No   Trouble with walking or balance? No          5/13/2024   Activities of Daily Living- Home Safety   Needs help with the following daily activites None of the above   Safety concerns in the home None of the above         5/13/2024   Dental   Dentist two times every year? (!) NO         5/13/2024   Hearing Screening   Hearing concerns? None of the above         5/13/2024   Driving Risk Screening   Patient/family members have concerns about driving No         5/13/2024   General Alertness/Fatigue Screening   Have you been more tired than usual lately? No         5/13/2024   Urinary Incontinence Screening   Bothered by leaking urine in past 6 months No         5/13/2024   TB Screening   Were you born outside of the [place]? No       Today's PHQ-9 Score:       5/13/2024     9:45 AM   PHQ-9 SCORE   PHQ-9 Total Score MyChart 0   PHQ-9 Total Score 0         5/13/2024   Substance Use   Alcohol more than 3/day or more than 7/wk No   Do you have a current opioid prescription? No   How severe/bad is pain from 1 to 10? 2/10   Do you use any other substances recreationally? No     Social History     Tobacco Use    Smoking status: Every Day     Current packs/day: 1.00     Average packs/day: 1 pack/day for 30.0 years (30.0 ttl pk-yrs)     Types: Cigarettes     Passive exposure: Current    Smokeless tobacco: Never   Vaping Use    Vaping status: Never Used   Substance Use Topics    Alcohol use: Yes     Comment: rarely    Drug use: No          Mammogram Screening - Mammogram every 1-2 years updated in Health Maintenance based on mutual decision making    ASCVD Risk   The 10-year ASCVD risk score (Anne VASQUEZ, et al., 2019) is: 11%    Values used to calculate the score:      Age: 68 years      Sex: Female      Is Non- : No      Diabetic: No      Tobacco smoker: Yes       Systolic Blood Pressure: 130 mmHg      Is BP treated: No      HDL Cholesterol: 53 mg/dL      Total Cholesterol: 182 mg/dL    Fracture Risk Assessment Tool  Link to Frax Calculator  Use the information below to complete the Frax calculator  : 1955  Sex: female  Weight (kg): 100.7 kg (actual weight)  Height (cm): 165.1 cm  Previous Fragility Fracture:  Yes  History of parent with fractured hip:  No  Current Smoking:  Yes  Patient has been on glucocorticoids for more than 3 months (5mg/day or more): No  Rheumatoid Arthritis on Problem List:  No  Secondary Osteoporosis on Problem List:  No  Consumes 3 or more units of alcohol per day: No  Femoral Neck BMD (g/cm2)            Reviewed and updated as needed this visit by Provider                    Lab work is in process  BP Readings from Last 3 Encounters:   24 130/80   24 160/94   23 128/80    Wt Readings from Last 3 Encounters:   24 100.7 kg (222 lb)   23 91.6 kg (202 lb)   23 94.3 kg (208 lb)                  Patient Active Problem List   Diagnosis    Closed fracture of ankle    Tobacco abuse counseling    Closed compression fracture of L4 lumbar vertebra, with routine healing, subsequent encounter    LPRD (laryngopharyngeal reflux disease)    Tobacco use    Acute bilateral low back pain with right-sided sciatica    Abnormal mammogram    COPD (chronic obstructive pulmonary disease) (H)    Class 2 severe obesity due to excess calories with serious comorbidity in adult (H)     Past Surgical History:   Procedure Laterality Date    ------------OTHER-------------      remove polyps from vocal cords     SECTION      son    FRACTURE TX, ANKLE RT/LT Right     surgery x 2    IR CONSULTATION FOR IR EXAM  2023    IR FLUORO 0-1 HOUR  2023       Social History     Tobacco Use    Smoking status: Every Day     Current packs/day: 1.00     Average packs/day: 1 pack/day for 30.0 years (30.0 ttl pk-yrs)     Types:  Cigarettes     Passive exposure: Current    Smokeless tobacco: Never   Substance Use Topics    Alcohol use: Yes     Comment: rarely     Family History   Problem Relation Age of Onset    Coronary Artery Disease Mother 73    Myocardial Infarction Mother     Diabetes Brother     Lung Cancer Father 66    Diabetes Maternal Grandmother     Hypertension No family hx of     Hyperlipidemia No family hx of     Breast Cancer No family hx of     Colon Cancer No family hx of     Asthma No family hx of     Thyroid Disease No family hx of          Current Outpatient Medications   Medication Sig Dispense Refill    albuterol (PROAIR HFA/PROVENTIL HFA/VENTOLIN HFA) 108 (90 Base) MCG/ACT inhaler Inhale 2 puffs into the lungs every 6 hours 18 g 3    albuterol (PROVENTIL) (2.5 MG/3ML) 0.083% neb solution USE 1 VIAL IN NEBULIZER EVERY 6 HOURS AS NEEDED FOR SHORTNESS OF BREATH DYSPNEA  OR  WHEEZING 150 mL 3    alendronate (FOSAMAX) 70 MG tablet Take 1 tablet by mouth once a week 12 tablet 0    calcium-vitamin D (CALTRATE) 600-400 MG-UNIT per tablet Take 1 tablet by mouth 2 times daily       Allergies   Allergen Reactions    Pcn [Penicillins]      Current providers sharing in care for this patient include:  Patient Care Team:  Sandra Zamora APRN CNP as PCP - General (Family Medicine)  Sandra Zamora APRN CNP as Assigned PCP    The following health maintenance items are reviewed in Epic and correct as of today:  Health Maintenance   Topic Date Due    Pneumococcal Vaccine: 65+ Years (1 of 2 - PCV) Never done    RSV VACCINE (Pregnancy & 60+) (1 - 1-dose 60+ series) Never done    ZOSTER IMMUNIZATION (2 of 2) 05/13/2021    MAMMO SCREENING  08/31/2022    COVID-19 Vaccine (4 - 2023-24 season) 09/01/2023    COLORECTAL CANCER SCREENING  09/29/2023    NICOTINE/TOBACCO CESSATION COUNSELING Q 1 YR  05/17/2024    LUNG CANCER SCREENING  05/24/2024    CASSIDY ASSESSMENT  08/13/2024    INFLUENZA VACCINE (Season Ended) 09/01/2024     "MEDICARE ANNUAL WELLNESS VISIT  05/13/2025    FALL RISK ASSESSMENT  05/13/2025    GLUCOSE  05/17/2026    LIPID  05/17/2028    ADVANCE CARE PLANNING  05/13/2029    DTAP/TDAP/TD IMMUNIZATION (2 - Td or Tdap) 03/18/2031    DEXA  05/24/2038    HEPATITIS C SCREENING  Completed    PHQ-2 (once per calendar year)  Completed    IPV IMMUNIZATION  Aged Out    HPV IMMUNIZATION  Aged Out    MENINGITIS IMMUNIZATION  Aged Out    RSV MONOCLONAL ANTIBODY  Aged Out         Review of Systems  CONSTITUTIONAL: NEGATIVE for fever, chills, change in weight  INTEGUMENTARY/SKIN: psoriasis on her elbows   EYES: NEGATIVE for vision changes or irritation  ENT/MOUTH: NEGATIVE for ear, mouth and throat problems  RESP:Hx COPD  CV: NEGATIVE for chest pain, palpitations or peripheral edema  GI: NEGATIVE for nausea, abdominal pain, heartburn, or change in bowel habits  : denies dysuria   MUSCULOSKELETAL: back discomfort   NEURO: NEGATIVE for weakness, dizziness or paresthesias  ENDOCRINE: NEGATIVE for temperature intolerance, skin/hair changes  PSYCHIATRIC: NEGATIVE for changes in mood or affect     Objective    Exam  /80   Pulse 100   Resp 18   Ht 1.651 m (5' 5\")   Wt 100.7 kg (222 lb)   SpO2 98%   BMI 36.94 kg/m     Estimated body mass index is 36.94 kg/m  as calculated from the following:    Height as of this encounter: 1.651 m (5' 5\").    Weight as of this encounter: 100.7 kg (222 lb).    Physical Exam  GENERAL: alert and no distress  EYES: Eyes grossly normal to inspection, PERRL and conjunctivae and sclerae normal  HENT: ear canals and TM's normal, nose and mouth without ulcers or lesions  NECK: no adenopathy, no asymmetry, masses, or scars  RESP: lungs clear to auscultation - no rales, rhonchi or wheezes  CV: regular rate and rhythm, normal S1 S2, no S3 or S4, no murmur, click or rub, no peripheral edema  ABDOMEN: soft, nontender, no hepatosplenomegaly, no masses and bowel sounds normal  MS: no gross musculoskeletal defects " noted, no edema  SKIN: large dry plaque patche right elbow   NEURO: Normal strength and tone, sensory exam grossly normal, mentation intact, speech normal, and cranial nerves 2-12 intact  PSYCH: mentation appears normal, affect normal/bright  LYMPH: no cervical, supraclavicular, axillary, or inguinal adenopathy        5/13/2024   Mini Cog   Clock Draw Score 2 Normal   3 Item Recall 3 objects recalled   Mini Cog Total Score 5              Signed Electronically by: DEVORAH Palacios CNP    Answers submitted by the patient for this visit:  Patient Health Questionnaire (Submitted on 5/13/2024)  If you checked off any problems, how difficult have these problems made it for you to do your work, take care of things at home, or get along with other people?: Not difficult at all  PHQ9 TOTAL SCORE: 0  CASSIDY-7 (Submitted on 5/13/2024)  CASSIDY 7 TOTAL SCORE: 1  Lung Cancer Screening Shared Decision Making Visit     Corinne S Eden, a 68 year old female, is eligible for lung cancer screening    History   Smoking Status    Every Day    Types: Cigarettes   Smokeless Tobacco    Never       I have discussed with patient the risks and benefits of screening for lung cancer with low-dose CT.     The risks include:    radiation exposure: one low dose chest CT has as much ionizing radiation as about 15 chest x-rays, or 6 months of background radiation living in [place]      false positives: most findings/nodules are NOT cancer, but some might still require additional diagnostic evaluation, including biopsy    over-diagnosis: some slow growing cancers that might never have been clinically significant will be detected and treated unnecessarily     The benefit of early detection of lung cancer is contingent upon adherence to annual screening or more frequent follow up if indicated.     Furthermore, to benefit from screening, Corinne must be willing and able to undergo diagnostic procedures, if indicated. Although no specific guide is  available for determining severity of comorbidities, it is reasonable to withhold screening in patients who have greater mortality risk from other diseases.     We did discuss that the best way to prevent lung cancer is to not smoke.    Some patients may value a numeric estimation of lung cancer risk when evaluating if lung cancer screening is right for them, here is one calculator:    ShouldIScreen

## 2024-05-13 NOTE — TELEPHONE ENCOUNTER
Received a DENIAL from [facility] for  albuterol (PROVENTIL) (2.5 MG/3ML) 0.083% neb solution.             Scanned in Epic.

## 2024-05-13 NOTE — PATIENT INSTRUCTIONS
"Preventive Care Advice   This is general advice we often give to help people stay healthy. Your care team may have specific advice just for you. Please talk to your care team about your own preventive care needs.  Lifestyle  Exercise at least 150 minutes each week (30 minutes a day, 5 days a week).  Do muscle strengthening activities 2 days a week. These help control your weight and prevent disease.  No smoking.  Wear sunscreen to prevent skin cancer.  Have your home tested for radon every 2 to 5 years. Radon is a colorless, odorless gas that can harm your lungs. To learn more, go to www.health.[facility].mn. and search for \"Radon in Homes.\"  Keep guns unloaded and locked up in a safe place like a safe or gun vault, or, use a gun lock and hide the keys. Always lock away bullets separately. To learn more, visit [place].gov and search for \"safe gun storage.\"  Nutrition  Eat 5 or more servings of fruits and vegetables each day.  Try wheat bread, brown rice and whole grain pasta (instead of white bread, rice, and pasta).  Get enough calcium and vitamin D. Check the label on foods and aim for 100% of the RDA (recommended daily allowance).  Regular exams  Have a dental exam and cleaning every 6 months.  See your health care team every year to talk about:  Any changes in your health.  Any medicines your care team has prescribed.  Preventive care, family planning, and ways to prevent chronic diseases.  Shots (vaccines)   HPV shots (up to age 26), if you've never had them before.  Hepatitis B shots (up to age 59), if you've never had them before.  COVID-19 shot: Get this shot when it's due.  Flu shot: Get a flu shot every year.  Tetanus shot: Get a tetanus shot every 10 years.  Pneumococcal, hepatitis A, and RSV shots: Ask your care team if you need these based on your risk.  Shingles shot (for age 50 and up).  General health tests  Diabetes screening:  Starting at age 35, Get screened for diabetes at least every 3 years.  If " you are younger than age 35, ask your care team if you should be screened for diabetes.  Cholesterol test: At age 39, start having a cholesterol test every 5 years, or more often if advised.  Bone density scan (DEXA): At age 50, ask your care team if you should have this scan for osteoporosis (brittle bones).  Hepatitis C: Get tested at least once in your life.  Abdominal aortic aneurysm screening: Talk to your doctor about having this screening if you:  Have ever smoked; and  Are biologically male; and  Are between the ages of 65 and 75.  STIs (sexually transmitted infections)  Before age 24: Ask your care team if you should be screened for STIs.  After age 24: Get screened for STIs if you're at risk. You are at risk for STIs (including HIV) if:  You are sexually active with more than one person.  You don't use condoms every time.  You or a partner was diagnosed with a sexually transmitted infection.  If you are at risk for HIV, ask about PrEP medicine to prevent HIV.  Get tested for HIV at least once in your life, whether you are at risk for HIV or not.  Cancer screening tests  Cervical cancer screening: If you have a cervix, begin getting regular cervical cancer screening tests at age 21. Most people who have regular screenings with normal results can stop after age 65. Talk about this with your provider.  Breast cancer scan (mammogram): If you've ever had breasts, begin having regular mammograms starting at age 40. This is a scan to check for breast cancer.  Colon cancer screening: It is important to start screening for colon cancer at age 45.  Have a colonoscopy test every 10 years (or more often if you're at risk) Or, ask your provider about stool tests like a FIT test every year or Cologuard test every 3 years.  To learn more about your testing options, visit: [email or web address]/011332.pdf.  For help making a decision, visit: [email or web address].  Prostate cancer screening test: If you have a prostate and are age 55  to 69, ask your provider if you would benefit from a yearly prostate cancer screening test.  Lung cancer screening: If you are a current or former smoker age 50 to 80, ask your care team if ongoing lung cancer screenings are right for you.  For informational purposes only. Not to replace the advice of your health care provider. Copyright   2023 [employer]. All rights reserved. Clinically reviewed by the [facility] Transitions Program. [place] [id number] - REV 04/24.    Lung Cancer Screening   Frequently Asked Questions  If you are at high-risk for lung cancer, getting screened with low-dose computed tomography (LDCT) every year can help save your life. This handout offers answers to some of the most common questions about lung cancer screening. If you have other questions, please call [phone] ([phone]).     What is it?  Lung cancer screening uses special X-ray technology to create an image of your lung tissue. The exam is quick and easy and takes less than 10 seconds. We don t give you any medicine or use any needles. You can eat before and after the exam. You don t need to change your clothes as long as the clothing on your chest doesn t contain metal. But, you do need to be able to hold your breath for at least 6 seconds during the exam.    What is the goal of lung cancer screening?  The goal of lung cancer screening is to save lives. Many times, lung cancer is not found until a person starts having physical symptoms. Lung cancer screening can help detect lung cancer in the earliest stages when it may be easier to treat.    Who should be screened for lung cancer?  We suggest lung cancer screening for anyone who is at high-risk for lung cancer. You are in the high-risk group if you:      are between the ages of 55 and 79, and    have smoked at least 1 pack of cigarettes a day for 20 or more years, and    still smoke or have quit within the past 15 years.    However, if you have  a new cough or shortness of breath, you should talk to your doctor before being screened.    Why does it matter if I have symptoms?  Certain symptoms can be a sign that you have a condition in your lungs that should be checked and treated by your doctor. These symptoms include fever, chest pain, a new or changing cough, shortness of breath that you have never felt before, coughing up blood or unexplained weight loss. Having any of these symptoms can greatly affect the results of lung cancer screening.       Should all smokers get an LDCT lung cancer screening exam?  It depends. Lung cancer screening is for a very specific group of men and women who have a history of heavy smoking over a long period of time (see  Who should be screened for lung cancer  above).  I am in the high-risk group, but have been diagnosed with cancer in the past. Is LDCT lung cancer screening right for me?  In some cases, you should not have LDCT lung screening, such as when your doctor is already following your cancer with CT scan studies. Your doctor will help you decide if LDCT lung screening is right for you.  Do I need to have a screening exam every year?  Yes. If you are in the high-risk group described earlier, you should get an LDCT lung cancer screening exam every year until you are 79, or are no longer willing or able to undergo screening and possible procedures to diagnose and treat lung cancer.  How effective is LDCT at preventing death from lung cancer?  Studies have shown that LDCT lung cancer screening can lower the risk of death from lung cancer by 20 percent in people who are at high-risk.  What are the risks?  There are some risks and limitations of LDCT lung cancer screening. We want to make sure you understand the risks and benefits, so please let us know if you have any questions. Your doctor may want to talk with you more about these risks.    Radiation exposure: As with any exam that uses radiation, there is a very  small increased risk of cancer. The amount of radiation in LDCT is small--about the same amount a person would get from a mammogram. Your doctor orders the exam when he or she feels the potential benefits outweigh the risks.    False negatives: No test is perfect, including LDCT. It is possible that you may have a medical condition, including lung cancer, that is not found during your exam. This is called a false negative result.    False positives and more testing: LDCT very often finds something in the lung that could be cancer, but in fact is not. This is called a false positive result. False positive tests often cause anxiety. To make sure these findings are not cancer, you may need to have more tests. These tests will be done only if you give us permission. Sometimes patients need a treatment that can have side effects, such as a biopsy. For more information on false positives, see  What can I expect from the results?     Findings not related to lung cancer: Your LDCT exam also takes pictures of areas of your body next to your lungs. In a very small number of cases, the CT scan will show an abnormal finding in one of these areas, such as your kidneys, adrenal glands, liver or thyroid. This finding may not be serious, but you may need more tests. Your doctor can help you decide what other tests you may need, if any.  What can I expect from the results?  About 1 out of 4 LDCT exams will find something that may need more tests. Most of the time, these findings are lung nodules. Lung nodules are very small collections of tissue in the lung. These nodules are very common, and the vast majority--more than 97 percent--are not cancer (benign). Most are normal lymph nodes or small areas of scarring from past infections.  But, if a small lung nodule is found to be cancer, the cancer can be cured more than 90 percent of the time. To know if the nodule is cancer, we may need to get more images before your next yearly  screening exam. If the nodule has suspicious features (for example, it is large, has an odd shape or grows over time), we will refer you to a specialist for further testing.  Will my doctor also get the results?  Yes. Your doctor will get a copy of your results.  Is it okay to keep smoking now that there s a cancer screening exam?  No. Tobacco is one of the strongest cancer-causing agents. It causes not only lung cancer, but other cancers and cardiovascular (heart) diseases as well. The damage caused by smoking builds over time. This means that the longer you smoke, the higher your risk of disease. While it is never too late to quit, the sooner you quit, the better.  Where can I find help to quit smoking?  The best way to prevent lung cancer is to stop smoking. If you have already quit smoking, congratulations and keep it up! For help on quitting smoking, please call [employer] at 9-390-QUITNOW (1-188.901.4120) or the American [employer] Society at 1-649.390.8041 to find local resources near you.  One-on-one health coaching:  If you d prefer to work individually with a health care provider on tobacco cessation, we offer:      Medication Therapy Management:  Our specially trained pharmacists work closely with you and your doctor to help you quit smoking.  Call [phone] or [phone] (toll free).

## 2024-05-25 ENCOUNTER — HOSPITAL ENCOUNTER (EMERGENCY)
Facility: HOSPITAL | Age: 69
Discharge: HOME | End: 2024-05-25
Attending: NURSE PRACTITIONER | Admitting: NURSE PRACTITIONER
Payer: MEDICARE

## 2024-05-25 VITALS
HEART RATE: 89 BPM | OXYGEN SATURATION: 95 % | WEIGHT: 220 LBS | BODY MASS INDEX: 36.65 KG/M2 | TEMPERATURE: 98.5 F | HEIGHT: 65 IN | SYSTOLIC BLOOD PRESSURE: 163 MMHG | RESPIRATION RATE: 18 BRPM | DIASTOLIC BLOOD PRESSURE: 89 MMHG

## 2024-05-25 DIAGNOSIS — J01.90 ACUTE SINUSITIS: Primary | ICD-10-CM

## 2024-05-25 PROCEDURE — G0463 HOSPITAL OUTPT CLINIC VISIT: HCPCS

## 2024-05-25 PROCEDURE — 99213 OFFICE O/P EST LOW 20 MIN: CPT | Performed by: NURSE PRACTITIONER

## 2024-05-25 RX ORDER — AZITHROMYCIN 250 MG/1
TABLET, FILM COATED ORAL
Qty: 6 TABLET | Refills: 0 | Status: SHIPPED | OUTPATIENT
Start: 2024-05-25 | End: 2024-05-30

## 2024-05-25 ASSESSMENT — ENCOUNTER SYMPTOMS
SINUS PAIN: 1
HEADACHES: 1
VOICE CHANGE: 0
CHILLS: 0
TROUBLE SWALLOWING: 0
FEVER: 1
COUGH: 0
SHORTNESS OF BREATH: 1
SINUS PRESSURE: 1

## 2024-05-25 ASSESSMENT — ACTIVITIES OF DAILY LIVING (ADL): ADLS_ACUITY_SCORE: 35

## 2024-05-25 NOTE — DISCHARGE INSTRUCTIONS
Continue taking the antibiotic as prescribed.  Continue using the [place] pot and taking Tylenol as needed.    Follow-up with your primary doctor as needed.    Return to urgent care or emergency room for any worsening or concerning symptoms.

## 2024-05-25 NOTE — ED PROVIDER NOTES
History     Chief Complaint   Patient presents with    Sinusitis     HPI  Corinne S Eden is a 68 year old female who presents ambulatory to urgent care with concerns of a sinus infection.  3 days ago patient started having some nasal congestion that worsened yesterday.  Today she has worsening sinus pain and pressure accompanied by low-grade fevers.  She had a significant burning sore throat yesterday that has since resolved.  She has been using her [place] pot and notes that she also uses a nasal spray.  She did take 2 DayQuil tablets earlier today.  Chronic shortness of breath that has not changed.  She continues to use her nebulizers as needed.  Current cigarette smoker. No chest pain or cough.    Patient is concerned symptoms are progressively worsening and usually end up with infection in her chest.        Allergies:  Allergies   Allergen Reactions    Pcn [Penicillins]        Problem List:    Patient Active Problem List    Diagnosis Date Noted    COPD (chronic obstructive pulmonary disease) (H) 05/13/2024     Priority: Medium    Class 2 severe obesity due to excess calories with serious comorbidity in adult (H) 05/13/2024     Priority: Medium    Abnormal mammogram 03/18/2021     Priority: Medium     right  Patient declining follow up testing      Acute bilateral low back pain with right-sided sciatica 07/24/2020     Priority: Medium    LPRD (laryngopharyngeal reflux disease) 05/15/2018     Priority: Medium    Tobacco use 05/15/2018     Priority: Medium    Tobacco abuse counseling 12/19/2017     Priority: Medium    Closed compression fracture of L4 lumbar vertebra, with routine healing, subsequent encounter 12/19/2017     Priority: Medium    Closed fracture of ankle 04/26/2006     Priority: Medium     Overview:   IMO Update 10/11          Past Medical History:    History reviewed. No pertinent past medical history.    Past Surgical History:    Past Surgical History:   Procedure Laterality Date     "------------OTHER-------------      remove polyps from vocal cords     SECTION      son    FRACTURE TX, ANKLE RT/LT Right 2005    surgery x 2    IR CONSULTATION FOR IR EXAM  2023    IR FLUORO 0-1 HOUR  2023       Family History:    Family History   Problem Relation Age of Onset    Coronary Artery Disease Mother 73    Myocardial Infarction Mother     Diabetes Brother     Lung Cancer Father 66    Diabetes Maternal Grandmother     Hypertension No family hx of     Hyperlipidemia No family hx of     Breast Cancer No family hx of     Colon Cancer No family hx of     Asthma No family hx of     Thyroid Disease No family hx of        Social History:  Marital Status:  Single [1]  Social History     Tobacco Use    Smoking status: Every Day     Current packs/day: 1.00     Average packs/day: 1 pack/day for 30.0 years (30.0 ttl pk-yrs)     Types: Cigarettes     Passive exposure: Current    Smokeless tobacco: Never   Vaping Use    Vaping status: Never Used   Substance Use Topics    Alcohol use: Yes     Comment: rarely    Drug use: No        Medications:    albuterol (PROAIR HFA/PROVENTIL HFA/VENTOLIN HFA) 108 (90 Base) MCG/ACT inhaler  albuterol (PROVENTIL) (2.5 MG/3ML) 0.083% neb solution  alendronate (FOSAMAX) 70 MG tablet  atorvastatin (LIPITOR) 20 MG tablet  azithromycin (ZITHROMAX) 250 MG tablet  calcium-vitamin D (CALTRATE) 600-400 MG-UNIT per tablet  triamcinolone (KENALOG) 0.1 % external cream          Review of Systems   Constitutional:  Positive for fever. Negative for chills.   HENT:  Positive for congestion, sinus pressure and sinus pain. Negative for trouble swallowing and voice change.    Respiratory:  Positive for shortness of breath (Chronic and unchanged). Negative for cough.    Neurological:  Positive for headaches.   All other systems reviewed and are negative.      Physical Exam   BP: 163/89  Pulse: 89  Temp: 98.5  F (36.9  C)  Resp: 18  Height: 165.1 cm (5' 5\")  Weight: 99.8 kg (220 " lb)  SpO2: 95 %      Physical Exam  Vitals and nursing note reviewed.   Constitutional:       General: She is not in acute distress.     Appearance: Normal appearance. She is not diaphoretic.   HENT:      Head: Atraumatic.      Right Ear: Tympanic membrane and ear canal normal.      Left Ear: Tympanic membrane and ear canal normal.      Nose: Congestion present.      Right Sinus: Frontal sinus tenderness present.      Left Sinus: Frontal sinus tenderness present.      Mouth/Throat:      Mouth: Mucous membranes are moist.   Eyes:      Conjunctiva/sclera: Conjunctivae normal.   Cardiovascular:      Rate and Rhythm: Normal rate and regular rhythm.      Heart sounds: Normal heart sounds.   Pulmonary:      Effort: Pulmonary effort is normal. No respiratory distress.      Breath sounds: Normal breath sounds. No wheezing or rhonchi.   Abdominal:      General: Abdomen is flat.   Musculoskeletal:      Cervical back: Neck supple.   Skin:     General: Skin is warm.      Coloration: Skin is not pale.   Neurological:      Mental Status: She is alert and oriented to person, place, and time.         ED Course        Procedures         No results found for this or any previous visit (from the past 24 hour(s)).    Medications - No data to display    Assessments & Plan (with Medical Decision Making)   68-year-old female that presented for evaluation of significant worsening sinus pain and pressure with symptoms having started 3 days ago.  Does have frontal sinus tenderness to palpation.  Also reports headaches.  Afebrile during this visit.  She did take DayQuil prior to this arrival.  Respirations are even and nonlabored.  Bilateral lung fields are clear to auscultation.  Current tobacco use.    Symptoms likely due to a viral infection.  Patient insistent on getting an antibiotic as she is concerned that she will end up with pneumonia as has been the case in the past when she waits too long.  Will treat with azithromycin.  Advised  her to continue using the [place] pot and nasal spray.  Tylenol as needed for pain or fevers.  Follow-up with primary doctor if no improvement in symptoms.  Return to urgent care or emergency room for any worsening or concerning symptoms.    I have reviewed the nursing notes.    I have reviewed the findings, diagnosis, plan and need for follow up with the patient.  This document was prepared using a combination of typing and voice generated software.  While every attempt was made for accuracy, spelling and grammatical errors may exist.         New Prescriptions    AZITHROMYCIN (ZITHROMAX) 250 MG TABLET    Take 2 tablets (500 mg) by mouth daily for 1 day, THEN 1 tablet (250 mg) daily for 4 days.       Final diagnoses:   Acute sinusitis       5/25/2024   [facility]       Mpofu, Prudence, CNP  05/25/24 0265

## 2024-05-25 NOTE — ED TRIAGE NOTES
Patient presents to urgent care for possible sinus problem x4 days. Patient has been taking tylenol and doing the netting pot, and dayquil. Patient wants something before it gets into her lungs. Patient took a covid test and it was negative so she denies the multiplex testing.

## 2024-06-06 ENCOUNTER — HOSPITAL ENCOUNTER (OUTPATIENT)
Dept: CT IMAGING | Facility: HOSPITAL | Age: 69
Discharge: HOME | End: 2024-06-06
Attending: NURSE PRACTITIONER | Admitting: NURSE PRACTITIONER
Payer: MEDICARE

## 2024-06-06 DIAGNOSIS — Z87.891 PERSONAL HISTORY OF TOBACCO USE: ICD-10-CM

## 2024-06-06 PROCEDURE — 71271 CT THORAX LUNG CANCER SCR C-: CPT

## 2024-06-10 ENCOUNTER — TELEPHONE (OUTPATIENT)
Dept: CARE COORDINATION | Facility: OTHER | Age: 69
End: 2024-06-10

## 2024-06-10 ENCOUNTER — VIRTUAL VISIT (OUTPATIENT)
Dept: FAMILY MEDICINE | Facility: OTHER | Age: 69
End: 2024-06-10
Attending: NURSE PRACTITIONER
Payer: COMMERCIAL

## 2024-06-10 DIAGNOSIS — S22.050D COMPRESSION FRACTURE OF T5 VERTEBRA WITH ROUTINE HEALING, SUBSEQUENT ENCOUNTER: Primary | ICD-10-CM

## 2024-06-10 DIAGNOSIS — S22.050D COMPRESSION FRACTURE OF T6 VERTEBRA WITH ROUTINE HEALING, SUBSEQUENT ENCOUNTER: ICD-10-CM

## 2024-06-10 PROCEDURE — 99442 PR PHYSICIAN TELEPHONE EVALUATION 11-20 MIN: CPT | Mod: 93 | Performed by: NURSE PRACTITIONER

## 2024-06-10 NOTE — TELEPHONE ENCOUNTER
Received call from Kirsten with [facility] regarding some incidental findings during recent lung CT scan.   Reported information to PCP, who would like to do a phone visit to discuss further. Scheduled patient for telephone visit to go over results. Scheduled patient for visit for today.   Attempted to call Kirsten back, no answer. LVM requesting a call back.     John SANCHEZ RN, Care Coordinator  [facility]

## 2024-06-10 NOTE — PROGRESS NOTES
Corinne is a 68 year old who is being evaluated via a billable telephone visit.    What phone number would you like to be contacted at? [phone]  How would you like to obtain your AVS? Kori  Originating Location (pt. Location): Home    Distant Location (provider location):  On-site    Assessment & Plan     Compression fracture of T5 vertebra with routine healing, subsequent encounter  Compression fracture of T6 vertebra with routine healing, subsequent encounter  Continues to have compression fracture.  With a new T6   She does not want Kypoplasty or injections at this time.  She rarely uses ibuprofen. Ok ot use pain cream or lidopatches as needed  Follow up with any questions or concerns.  She may consider back brace if no improvement or for support             See Patient Instructions    No follow-ups on file.    Subjective   Corinne is a 68 year old, presenting for the following health issues:  No chief complaint on file.    HPI     Discuss CT results   History of compression fracture T5 with new compression fracture at T6  History of osteopetrosis on DEXA scan - taking fosamax regularly  Back pain slowly improving  She is watching what she lifts   She is planning on smoking cessation, once she completes the back of tobacco she is currently using   She is going to work on taking calcium and vitamin D regularly       Review of Systems  CONSTITUTIONAL: NEGATIVE for fever, chills, change in weight  INTEGUMENTARY/SKIN: NEGATIVE for worrisome rashes, moles or lesions  RESP: NEGATIVE for significant cough - some mild SOB with pain   CV: NEGATIVE for chest pain, palpitations or peripheral edema  MUSCULOSKELETAL:   NEURO: limited lifting       Objective           Vitals:  No vitals were obtained today due to virtual visit.    Physical Exam   General: Alert and no distress //Respiratory: No audible wheeze, cough, or shortness of breath // Psychiatric:  Appropriate affect, tone, and pace of words          CT Low  Dose Lung Cancer Screening     History:  Lung cancer screening; Chest CT for lung cancer screening in  the last year; No prior CT chest with abnormal finding; Personal  history of tobacco use Screening for lung cancer, smoking.     Number of packs-year of smokin  Current or former smoker?: Current     Comparison: 2023     Technique: Helical acquisition low dose CT chest. Images reviewed in  lung, soft tissue and bone windows.  DLP: 97 (mGy*cm)  CTDIvol: 2.70 (mGy)     Findings: [All follow up of nodules are based on ACR guidelines for  lung cancer screening and measurements of each nodule size must be the  mean of the longest axial plane measurement by its perpendicular  measured to the nearest decimal and rounded up to the nearest whole  number. ]     No concerning pulmonary nodules. There is lower lobe atelectasis.     Emphysema: Mild centrilobular emphysema.     Coronary artery calcium: Mild coronary artery calcifications.     Additional findings: No pleural effusion or pneumothorax. No other  mediastinal adenopathy. No acute findings in the visualized upper  abdomen. Slight interval worsening of the previously demonstrated T5  compression fracture. There is also a mild superior endplate  compression fracture of T6 which is new since 2023.                                                                      Impression:   1. ACR Assessment Category ():  Lung-RADS Category 1. Negative       Recommendation:  Lung-RADS Category 1. Negative, continue annual  screening with Lung cancer screening CT (please order exam code  [id number])      2. Significant Incidental Finding(s):  Category S: Yes.  a.  Slight interval worsening of the previously demonstrated T5  compression fracture and age indeterminate mild superior plate  compression fracture of T6 which is new since 2023.      DARIO MCKEON MD   Phone call duration: 11 minutes  Signed Electronically by: DEVORAH Palacios CNP

## 2024-06-29 DIAGNOSIS — M81.6 LOCALIZED OSTEOPOROSIS WITHOUT CURRENT PATHOLOGICAL FRACTURE: ICD-10-CM

## 2024-07-01 RX ORDER — ALENDRONATE SODIUM 70 MG/1
70 TABLET ORAL WEEKLY
Qty: 12 TABLET | Refills: 1 | Status: SHIPPED | OUTPATIENT
Start: 2024-07-01

## 2024-07-01 NOTE — TELEPHONE ENCOUNTER
Alendronate (Fosamax) 70 MG tablet    Last Written Prescription Date:  04/08/2024  Last Fill Quantity: 12,   # refills: 0  Last Office Visit: 05/13/2024

## 2024-07-02 DIAGNOSIS — L40.9 PSORIASIS: ICD-10-CM

## 2024-07-02 RX ORDER — TRIAMCINOLONE ACETONIDE 1 MG/G
CREAM TOPICAL
Qty: 80 G | Refills: 0 | Status: SHIPPED | OUTPATIENT
Start: 2024-07-02 | End: 2024-07-29

## 2024-07-02 NOTE — TELEPHONE ENCOUNTER
Triamcinolone (Kenalog)  0.1% external cream     Last Written Prescription Date:  05/13/2024  Last Fill Quantity: 80g,   # refills: 0  Last Office Visit: 05/13/2024

## 2024-07-27 DIAGNOSIS — L40.9 PSORIASIS: ICD-10-CM

## 2024-07-29 RX ORDER — TRIAMCINOLONE ACETONIDE 1 MG/G
CREAM TOPICAL
Qty: 80 G | Refills: 0 | Status: SHIPPED | OUTPATIENT
Start: 2024-07-29

## 2024-09-13 DIAGNOSIS — J43.9 PULMONARY EMPHYSEMA, UNSPECIFIED EMPHYSEMA TYPE (H): ICD-10-CM

## 2024-09-13 RX ORDER — ALBUTEROL SULFATE 0.83 MG/ML
SOLUTION RESPIRATORY (INHALATION)
Qty: 150 ML | Refills: 3 | Status: SHIPPED | OUTPATIENT
Start: 2024-09-13

## 2024-09-13 NOTE — TELEPHONE ENCOUNTER
Albuterol 0.083% neb      Last Written Prescription Date:  5-13-24  Last Fill Quantity: 150 ML,   # refills: 3  Last Office Visit: 5-13-24  Future Office visit:

## 2025-02-13 ENCOUNTER — APPOINTMENT (OUTPATIENT)
Dept: GENERAL RADIOLOGY | Facility: HOSPITAL | Age: 70
End: 2025-02-13
Attending: NURSE PRACTITIONER
Payer: MEDICARE

## 2025-02-13 ENCOUNTER — HOSPITAL ENCOUNTER (EMERGENCY)
Facility: HOSPITAL | Age: 70
Discharge: HOME | End: 2025-02-13
Attending: NURSE PRACTITIONER | Admitting: NURSE PRACTITIONER
Payer: MEDICARE

## 2025-02-13 VITALS
RESPIRATION RATE: 20 BRPM | OXYGEN SATURATION: 93 % | HEART RATE: 100 BPM | SYSTOLIC BLOOD PRESSURE: 174 MMHG | DIASTOLIC BLOOD PRESSURE: 89 MMHG | TEMPERATURE: 98.6 F

## 2025-02-13 DIAGNOSIS — S93.402A LEFT ANKLE SPRAIN: Primary | ICD-10-CM

## 2025-02-13 DIAGNOSIS — S93.402A SPRAIN OF LEFT ANKLE, UNSPECIFIED LIGAMENT, INITIAL ENCOUNTER: ICD-10-CM

## 2025-02-13 PROCEDURE — G0463 HOSPITAL OUTPT CLINIC VISIT: HCPCS

## 2025-02-13 PROCEDURE — 99213 OFFICE O/P EST LOW 20 MIN: CPT | Performed by: NURSE PRACTITIONER

## 2025-02-13 PROCEDURE — 73610 X-RAY EXAM OF ANKLE: CPT | Mod: LT

## 2025-02-13 ASSESSMENT — COLUMBIA-SUICIDE SEVERITY RATING SCALE - C-SSRS
2. HAVE YOU ACTUALLY HAD ANY THOUGHTS OF KILLING YOURSELF IN THE PAST MONTH?: NO
6. HAVE YOU EVER DONE ANYTHING, STARTED TO DO ANYTHING, OR PREPARED TO DO ANYTHING TO END YOUR LIFE?: NO
1. IN THE PAST MONTH, HAVE YOU WISHED YOU WERE DEAD OR WISHED YOU COULD GO TO SLEEP AND NOT WAKE UP?: NO

## 2025-02-13 ASSESSMENT — ENCOUNTER SYMPTOMS
VOMITING: 0
DIARRHEA: 0
CHILLS: 0
PSYCHIATRIC NEGATIVE: 1
SHORTNESS OF BREATH: 0
FEVER: 0
NAUSEA: 0

## 2025-02-13 ASSESSMENT — ACTIVITIES OF DAILY LIVING (ADL)
ADLS_ACUITY_SCORE: 41
ADLS_ACUITY_SCORE: 41

## 2025-02-13 NOTE — ED TRIAGE NOTES
Pt in for evaluation of left ankle pain. Reports she tripped over an extension cord. Pt is ambulatory to  room. Has not taken anything for pain. Pain 2/10, worse with ambulation 3/10.

## 2025-02-13 NOTE — ED PROVIDER NOTES
History     Chief Complaint   Patient presents with    Ankle Pain     HPI  Corinne S Eden is a 69 year old female who presents to urgent care today ambulatory with complaints of left ankle pain and swelling after patient rolled her ankle after tripping on an extension cord.  Had a near fall, did not fall to the floor, did not hit head, no LOC or any other injuries.  Denies any previous fracture, dislocation or surgery to left ankle.  No open skin wounds, redness or bruising.  Has not had any Tylenol or ibuprofen, pain currently tolerable.  Primary concern is to rule out a fracture.  No other concerns.    Allergies:  Allergies   Allergen Reactions    Pcn [Penicillins]        Problem List:    Patient Active Problem List    Diagnosis Date Noted    COPD (chronic obstructive pulmonary disease) (H) 2024     Priority: Medium    Class 2 severe obesity due to excess calories with serious comorbidity in adult (H) 2024     Priority: Medium    Abnormal mammogram 2021     Priority: Medium     right  Patient declining follow up testing      Acute bilateral low back pain with right-sided sciatica 2020     Priority: Medium    LPRD (laryngopharyngeal reflux disease) 05/15/2018     Priority: Medium    Tobacco use 05/15/2018     Priority: Medium    Tobacco abuse counseling 2017     Priority: Medium    Closed compression fracture of L4 lumbar vertebra, with routine healing, subsequent encounter 2017     Priority: Medium    Closed fracture of ankle 2006     Priority: Medium     Overview:   IMO Update 10/11          Past Medical History:    History reviewed. No pertinent past medical history.    Past Surgical History:    Past Surgical History:   Procedure Laterality Date    ------------OTHER-------------      remove polyps from vocal cords     SECTION      son    FRACTURE TX, ANKLE RT/LT Right     surgery x 2    IR CONSULTATION FOR IR EXAM  2023    IR FLUORO 0-1 HOUR   "6/2/2023       Family History:    Family History   Problem Relation Age of Onset    Coronary Artery Disease Mother 73    Myocardial Infarction Mother     Diabetes Brother     Lung Cancer Father 66    Diabetes Maternal Grandmother     Hypertension No family hx of     Hyperlipidemia No family hx of     Breast Cancer No family hx of     Colon Cancer No family hx of     Asthma No family hx of     Thyroid Disease No family hx of        Social History:  Marital Status:  Single [1]  Social History     Tobacco Use    Smoking status: Every Day     Current packs/day: 1.00     Average packs/day: 1 pack/day for 30.0 years (30.0 ttl pk-yrs)     Types: Cigarettes     Passive exposure: Current    Smokeless tobacco: Never   Vaping Use    Vaping status: Never Used   Substance Use Topics    Alcohol use: Yes     Comment: rarely    Drug use: No        Medications:    albuterol (PROAIR HFA/PROVENTIL HFA/VENTOLIN HFA) 108 (90 Base) MCG/ACT inhaler  albuterol (PROVENTIL) (2.5 MG/3ML) 0.083% neb solution  alendronate (FOSAMAX) 70 MG tablet  atorvastatin (LIPITOR) 20 MG tablet  calcium-vitamin D (CALTRATE) 600-400 MG-UNIT per tablet  triamcinolone (KENALOG) 0.1 % external cream      Review of Systems   Constitutional:  Negative for chills and fever.   Respiratory:  Negative for shortness of breath.    Cardiovascular:  Negative for chest pain.   Gastrointestinal:  Negative for diarrhea, nausea and vomiting.   Musculoskeletal:  Negative for gait problem.        Left ankle pain and swelling   Skin: Negative.    Psychiatric/Behavioral: Negative.       Physical Exam   BP: (!) 181/111 (states \"i just finished an energy drink\")  Pulse: 100  Temp: 98.6  F (37  C)  Resp: 20  SpO2: 93 %  Recheck  BP: 174/89    Physical Exam  Vitals and nursing note reviewed.   Constitutional:       General: She is not in acute distress.     Appearance: Normal appearance. She is not ill-appearing or toxic-appearing.   Cardiovascular:      Rate and Rhythm: Normal rate " and regular rhythm.      Pulses: Normal pulses.      Heart sounds: Normal heart sounds.   Pulmonary:      Effort: Pulmonary effort is normal.      Breath sounds: Normal breath sounds.   Musculoskeletal:      Left lower leg: Normal.      Left ankle: Swelling present. No deformity, ecchymosis or lacerations. Tenderness present over the ATF ligament. Normal range of motion. Normal pulse.      Left foot: Normal.   Skin:     General: Skin is warm and dry.      Capillary Refill: Capillary refill takes less than 2 seconds.   Neurological:      Mental Status: She is alert.   Psychiatric:         Mood and Affect: Mood normal.       ED Course     Procedures    Results for orders placed or performed during the hospital encounter of 02/13/25 (from the past 24 hours)   XR Ankle Left G/E 3 Views    Narrative    EXAM: XR ANKLE LEFT G/E 3 VIEWS  LOCATION: [facility]  DATE: 2/13/2025    INDICATION: Trauma  COMPARISON: 01/31/2011      Impression    IMPRESSION: No acute bony finding such as fracture or dislocation identified. Prominent calcaneal spurring at the plantar surface. Mild degenerative changes in the mid foot and ankle regions.       Medications - No data to display    Assessments & Plan (with Medical Decision Making)     I have reviewed the nursing notes.    I have reviewed the findings, diagnosis, plan and need for follow up with the patient.  (C51.862B) Left ankle sprain  (primary encounter diagnosis)  Plan:   Patient ambulatory with a nontoxic appearance.  Patient arrived with complaints of rolling her left ankle and having a near fall.  Pain to ATFL with mild swelling.  No open skin wounds or bruising.  No previous fracture, dislocation or surgery to left ankle.  No foot pain.  Left ankle x-ray completed, self read and do not see any signs of fracture at this time.  Will treat as left ankle sprain.  Patient to follow RICE.  Ace wrap applied.  Alternate Tylenol and ibuprofen for pain.  Will notify of results  if any changes.  Follow-up with primary care provider or return to urgent care/ED with any worsening in condition or additional concerns.  Patient in agreement treatment plan.    Discharge Medication List as of 2/13/2025  6:40 PM        Final diagnoses:   Left ankle sprain     2/13/2025   [facility]       Madhuri Garcia NP  02/13/25 [id number]

## 2025-02-14 NOTE — DISCHARGE INSTRUCTIONS
Rest  Ice  Compression with ace wrap  Elevate  Alternate tylenol and ibuprofen as needed for pain   Follow-up with primary care provider or return to urgent care/ED with any worsening in condition or additional concerns

## 2025-02-14 NOTE — ED NOTES
Patient discharged to Home at this time. Patient given written and verbal discharge instructions regarding home care, follow-up, and medications. Patient verbalized understanding of all discharge instructions. Rest and hydration encouraged. Patient encouraged to return to the ED if they experience new, worsening, or concerning symptoms.     Patient to follow-up with PCP as needed.    Will call with xray results. RICE, tylenol and ibuprofen for pain.

## 2025-02-15 DIAGNOSIS — J43.9 PULMONARY EMPHYSEMA, UNSPECIFIED EMPHYSEMA TYPE (H): ICD-10-CM

## 2025-02-17 RX ORDER — ALBUTEROL SULFATE 0.83 MG/ML
SOLUTION RESPIRATORY (INHALATION)
Qty: 180 ML | Refills: 0 | Status: SHIPPED | OUTPATIENT
Start: 2025-02-17

## 2025-02-17 NOTE — TELEPHONE ENCOUNTER
Proventil      Last Written Prescription Date:  9/13/2024  Last Fill Quantity: 150 mL,   # refills: 3  Last Office Visit: 6/10/2024  Future Office visit:       Routing refill request to provider for review/approval because:   Medication is active on med list and the sig matches. RN to manually verify dose and sig if red X/fail.

## 2025-03-25 DIAGNOSIS — J43.9 PULMONARY EMPHYSEMA, UNSPECIFIED EMPHYSEMA TYPE (H): ICD-10-CM

## 2025-03-25 RX ORDER — ALBUTEROL SULFATE 0.83 MG/ML
SOLUTION RESPIRATORY (INHALATION)
Qty: 180 ML | Refills: 0 | Status: SHIPPED | OUTPATIENT
Start: 2025-03-25

## 2025-03-25 NOTE — TELEPHONE ENCOUNTER
Albuterol       Last Written Prescription Date:  2/17/2025  Last Fill Quantity: 180mL,   # refills: 0  Last Office Visit: 6/10/2024  Future Office visit:

## 2025-05-01 DIAGNOSIS — J43.9 PULMONARY EMPHYSEMA, UNSPECIFIED EMPHYSEMA TYPE (H): ICD-10-CM

## 2025-05-01 DIAGNOSIS — E78.2 MIXED HYPERLIPIDEMIA: ICD-10-CM

## 2025-05-01 RX ORDER — ALBUTEROL SULFATE 0.83 MG/ML
SOLUTION RESPIRATORY (INHALATION)
Qty: 180 ML | Refills: 0 | Status: SHIPPED | OUTPATIENT
Start: 2025-05-01

## 2025-05-01 RX ORDER — ATORVASTATIN CALCIUM 20 MG/1
20 TABLET, FILM COATED ORAL DAILY
Qty: 90 TABLET | Refills: 1 | Status: SHIPPED | OUTPATIENT
Start: 2025-05-01

## 2025-05-11 ENCOUNTER — HEALTH MAINTENANCE LETTER (OUTPATIENT)
Age: 70
End: 2025-05-11

## 2025-05-18 DIAGNOSIS — J43.9 PULMONARY EMPHYSEMA, UNSPECIFIED EMPHYSEMA TYPE (H): ICD-10-CM

## 2025-05-19 RX ORDER — ALBUTEROL SULFATE 90 UG/1
2 INHALANT RESPIRATORY (INHALATION) EVERY 6 HOURS
Qty: 18 G | Refills: 0 | Status: SHIPPED | OUTPATIENT
Start: 2025-05-19

## 2025-05-22 DIAGNOSIS — M81.6 LOCALIZED OSTEOPOROSIS WITHOUT CURRENT PATHOLOGICAL FRACTURE: ICD-10-CM

## 2025-05-22 RX ORDER — ALENDRONATE SODIUM 70 MG/1
70 TABLET ORAL WEEKLY
Qty: 12 TABLET | Refills: 1 | Status: SHIPPED | OUTPATIENT
Start: 2025-05-22

## 2025-05-22 NOTE — TELEPHONE ENCOUNTER
alendronate (FOSAMAX) 70 MG tablet       Last Written Prescription Date:  7/1/24  Last Fill Quantity: 12,   # refills: 1  Last Office Visit: 6/10/24  Future Office visit:    Next 5 appointments (look out 90 days)      Jul 28, 2025 9:00 AM  (Arrive by 8:45 AM)  Adult Preventative Visit with DEVORAH Munson CNP  [facility] [place] ([facility] [place] ) [address]  [place] [address]  [phone]             Routing refill request to provider for review/approval because:  Drug not on the [facility], P or  Health refill protocol or controlled substance

## 2025-06-16 ENCOUNTER — MYC MEDICAL ADVICE (OUTPATIENT)
Dept: FAMILY MEDICINE | Facility: OTHER | Age: 70
End: 2025-06-16

## 2025-06-22 ENCOUNTER — HEALTH MAINTENANCE LETTER (OUTPATIENT)
Age: 70
End: 2025-06-22

## 2025-07-05 ENCOUNTER — HOSPITAL ENCOUNTER (EMERGENCY)
Facility: HOSPITAL | Age: 70
Discharge: HOME | End: 2025-07-05
Attending: FAMILY MEDICINE | Admitting: FAMILY MEDICINE
Payer: MEDICARE

## 2025-07-05 VITALS
SYSTOLIC BLOOD PRESSURE: 170 MMHG | DIASTOLIC BLOOD PRESSURE: 80 MMHG | RESPIRATION RATE: 18 BRPM | HEART RATE: 102 BPM | TEMPERATURE: 98.4 F | OXYGEN SATURATION: 93 %

## 2025-07-05 DIAGNOSIS — Z72.0 TOBACCO ABUSE: ICD-10-CM

## 2025-07-05 DIAGNOSIS — K21.00 GASTROESOPHAGEAL REFLUX DISEASE WITH ESOPHAGITIS WITHOUT HEMORRHAGE: ICD-10-CM

## 2025-07-05 DIAGNOSIS — J02.9 PHARYNGITIS, UNSPECIFIED ETIOLOGY: Primary | ICD-10-CM

## 2025-07-05 LAB — S PYO DNA THROAT QL NAA+PROBE: DETECTED

## 2025-07-05 PROCEDURE — 87651 STREP A DNA AMP PROBE: CPT | Performed by: FAMILY MEDICINE

## 2025-07-05 PROCEDURE — 99213 OFFICE O/P EST LOW 20 MIN: CPT | Performed by: FAMILY MEDICINE

## 2025-07-05 PROCEDURE — G0463 HOSPITAL OUTPT CLINIC VISIT: HCPCS | Performed by: FAMILY MEDICINE

## 2025-07-05 RX ORDER — CEPHALEXIN 500 MG/1
500 CAPSULE ORAL 2 TIMES DAILY
Qty: 20 CAPSULE | Refills: 0 | Status: SHIPPED | OUTPATIENT
Start: 2025-07-05 | End: 2025-07-15

## 2025-07-05 ASSESSMENT — ENCOUNTER SYMPTOMS
PSYCHIATRIC NEGATIVE: 1
SORE THROAT: 1
NEUROLOGICAL NEGATIVE: 1
ALLERGIC/IMMUNOLOGIC NEGATIVE: 1
HEMATOLOGIC/LYMPHATIC NEGATIVE: 1
COUGH: 0
MUSCULOSKELETAL NEGATIVE: 1
ABDOMINAL PAIN: 0
FEVER: 0
SHORTNESS OF BREATH: 0

## 2025-07-05 ASSESSMENT — ACTIVITIES OF DAILY LIVING (ADL): ADLS_ACUITY_SCORE: 41

## 2025-07-05 ASSESSMENT — COLUMBIA-SUICIDE SEVERITY RATING SCALE - C-SSRS
1. IN THE PAST MONTH, HAVE YOU WISHED YOU WERE DEAD OR WISHED YOU COULD GO TO SLEEP AND NOT WAKE UP?: NO
6. HAVE YOU EVER DONE ANYTHING, STARTED TO DO ANYTHING, OR PREPARED TO DO ANYTHING TO END YOUR LIFE?: NO
2. HAVE YOU ACTUALLY HAD ANY THOUGHTS OF KILLING YOURSELF IN THE PAST MONTH?: NO

## 2025-07-05 NOTE — ED PROVIDER NOTES
History     Chief Complaint   Patient presents with    Pharyngitis     HPI  Corinnegulshan Brown is a 69 year old female who presents with complaint of sore throat.  She states she has had this for 2 to 3 days.  She tried some Zantac over-the-counter but this did not help her symptoms too much.  She denies any other symptoms such as fever or chills neck pain abdominal pain chest pain.  She is a heavy lifelong smoker so has a frequent smoker's cough this is unchanged.    Past history notable for compression fracture, COPD, hyperlipidemia      Allergies:  Allergies   Allergen Reactions    Pcn [Penicillins]        Problem List:    Patient Active Problem List    Diagnosis Date Noted    COPD (chronic obstructive pulmonary disease) (H) 2024     Priority: Medium    Class 2 severe obesity due to excess calories with serious comorbidity in adult (H) 2024     Priority: Medium    Abnormal mammogram 2021     Priority: Medium     right  Patient declining follow up testing      Acute bilateral low back pain with right-sided sciatica 2020     Priority: Medium    LPRD (laryngopharyngeal reflux disease) 05/15/2018     Priority: Medium    Tobacco use 05/15/2018     Priority: Medium    Tobacco abuse counseling 2017     Priority: Medium    Closed compression fracture of L4 lumbar vertebra, with routine healing, subsequent encounter 2017     Priority: Medium    Closed fracture of ankle 2006     Priority: Medium     Overview:   IMO Update 10/11          Past Medical History:    No past medical history on file.    Past Surgical History:    Past Surgical History:   Procedure Laterality Date    ------------OTHER-------------      remove polyps from vocal cords     SECTION      son    FRACTURE TX, ANKLE RT/LT Right     surgery x 2    IR CONSULTATION FOR IR EXAM  2023    IR FLUORO 0-1 HOUR  2023       Family History:    Family History   Problem Relation Age of Onset    Coronary  Artery Disease Mother 73    Myocardial Infarction Mother     Diabetes Brother     Lung Cancer Father 66    Diabetes Maternal Grandmother     Hypertension No family hx of     Hyperlipidemia No family hx of     Breast Cancer No family hx of     Colon Cancer No family hx of     Asthma No family hx of     Thyroid Disease No family hx of        Social History:  Marital Status:  Single [1]  Social History     Tobacco Use    Smoking status: Every Day     Current packs/day: 1.00     Average packs/day: 1 pack/day for 30.0 years (30.0 ttl pk-yrs)     Types: Cigarettes     Passive exposure: Current    Smokeless tobacco: Never   Vaping Use    Vaping status: Never Used   Substance Use Topics    Alcohol use: Yes     Comment: rarely    Drug use: No        Medications:    albuterol (PROAIR HFA/PROVENTIL HFA/VENTOLIN HFA) 108 (90 Base) MCG/ACT inhaler  albuterol (PROVENTIL) (2.5 MG/3ML) 0.083% neb solution  alendronate (FOSAMAX) 70 MG tablet  atorvastatin (LIPITOR) 20 MG tablet  calcium-vitamin D (CALTRATE) 600-400 MG-UNIT per tablet  triamcinolone (KENALOG) 0.1 % external cream          Review of Systems   Constitutional:  Negative for fever.   HENT:  Positive for sore throat.    Respiratory:  Negative for cough and shortness of breath.    Cardiovascular:  Negative for chest pain.   Gastrointestinal:  Negative for abdominal pain.   Genitourinary: Negative.    Musculoskeletal: Negative.    Skin:  Negative for rash.   Allergic/Immunologic: Negative.    Neurological: Negative.    Hematological: Negative.    Psychiatric/Behavioral: Negative.     All other systems reviewed and are negative.      Physical Exam   BP: (!) 170/80  Pulse: 102  Temp: 98.4  F (36.9  C)  Resp: 18  SpO2: 93 %      Physical Exam  Constitutional:       General: She is not in acute distress.     Appearance: Normal appearance. She is not diaphoretic.   HENT:      Head: Atraumatic.      Right Ear: Tympanic membrane and ear canal normal.      Left Ear: Tympanic  membrane and ear canal normal.      Mouth/Throat:      Mouth: Mucous membranes are moist.      Pharynx: Oropharynx is clear.   Eyes:      General: No scleral icterus.     Conjunctiva/sclera: Conjunctivae normal.   Cardiovascular:      Rate and Rhythm: Normal rate.      Heart sounds: Normal heart sounds.   Pulmonary:      Effort: Pulmonary effort is normal. No respiratory distress.      Comments: Diffusely diminished lung sounds in all lung fields consistent with underlying COPD no crackles or rhonchi  Abdominal:      General: Abdomen is flat.   Musculoskeletal:      Cervical back: Neck supple.   Skin:     General: Skin is warm.      Findings: No rash.   Neurological:      Mental Status: She is alert.         ED Course        Patient remained vitally stable I discussed with her likelihood that this is a episode of GERD rather than strep throat nevertheless we did do a strep test and we will notify her with results.  I did recommend smoking cessation and trial of Prilosec with follow-up with her primary MD.  She expressed understanding and agreed to comply.  Discharge Medication List as of 7/5/2025  9:11 AM          Final diagnoses:   Pharyngitis, unspecified etiology   Gastroesophageal reflux disease with esophagitis without hemorrhage   Tobacco abuse   She has strep pharyngitis    Addendum:  Patient strep test came back positive, we sent a prescription for Keflex to her pharmacy and she was notified.    7/5/2025   [facility]       Parish Hernandez MD  07/05/25 0915       Parish Hernandez MD  07/05/25 [id number]

## 2025-07-05 NOTE — ED TRIAGE NOTES
PT presents today with c/o sore throat, thinks its acid reflux, but wants to be check for strep since she watches her 7 month old grandson.

## 2025-07-05 NOTE — DISCHARGE INSTRUCTIONS
Please stop smoking.  You may benefit from taking Prilosec (OTC) 20 mg daily.  We will contact you if your strep test is positive, and prescribe some antibiotics for you.  Return here or see primary care if worsening-

## 2025-07-09 ENCOUNTER — TELEPHONE (OUTPATIENT)
Dept: FAMILY MEDICINE | Facility: OTHER | Age: 70
End: 2025-07-09

## 2025-07-09 DIAGNOSIS — J43.9 PULMONARY EMPHYSEMA, UNSPECIFIED EMPHYSEMA TYPE (H): ICD-10-CM

## 2025-07-09 RX ORDER — ALBUTEROL SULFATE 0.83 MG/ML
SOLUTION RESPIRATORY (INHALATION)
Qty: 180 ML | Refills: 0 | Status: SHIPPED | OUTPATIENT
Start: 2025-07-09

## 2025-07-09 NOTE — TELEPHONE ENCOUNTER
Received a DENIAL from [facility] for albuterol (PROVENTIL) (2.5 MG/3ML) 0.083% neb solution.

## 2025-07-11 ENCOUNTER — ANCILLARY PROCEDURE (OUTPATIENT)
Dept: GENERAL RADIOLOGY | Facility: OTHER | Age: 70
End: 2025-07-11
Attending: FAMILY MEDICINE
Payer: MEDICARE

## 2025-07-11 ENCOUNTER — OFFICE VISIT (OUTPATIENT)
Dept: FAMILY MEDICINE | Facility: OTHER | Age: 70
End: 2025-07-11
Attending: FAMILY MEDICINE
Payer: COMMERCIAL

## 2025-07-11 VITALS
DIASTOLIC BLOOD PRESSURE: 74 MMHG | HEART RATE: 92 BPM | SYSTOLIC BLOOD PRESSURE: 128 MMHG | WEIGHT: 225 LBS | BODY MASS INDEX: 37.44 KG/M2 | OXYGEN SATURATION: 93 % | TEMPERATURE: 97.5 F

## 2025-07-11 DIAGNOSIS — R06.09 DOE (DYSPNEA ON EXERTION): ICD-10-CM

## 2025-07-11 DIAGNOSIS — R19.5 POSITIVE COLORECTAL CANCER SCREENING USING COLOGUARD TEST: ICD-10-CM

## 2025-07-11 DIAGNOSIS — J43.9 PULMONARY EMPHYSEMA, UNSPECIFIED EMPHYSEMA TYPE (H): ICD-10-CM

## 2025-07-11 DIAGNOSIS — R07.9 EXERTIONAL CHEST PAIN: Primary | ICD-10-CM

## 2025-07-11 DIAGNOSIS — R07.9 EXERTIONAL CHEST PAIN: ICD-10-CM

## 2025-07-11 LAB
ALBUMIN SERPL BCG-MCNC: 4.1 G/DL (ref 3.5–5.2)
ALP SERPL-CCNC: 70 U/L (ref 40–150)
ALT SERPL W P-5'-P-CCNC: 30 U/L (ref 0–50)
ANION GAP SERPL CALCULATED.3IONS-SCNC: 11 MMOL/L (ref 7–15)
AST SERPL W P-5'-P-CCNC: 27 U/L (ref 0–45)
BASOPHILS # BLD AUTO: 0.1 10E3/UL (ref 0–0.2)
BASOPHILS NFR BLD AUTO: 1 %
BILIRUB SERPL-MCNC: 0.3 MG/DL
BUN SERPL-MCNC: 19.6 MG/DL (ref 8–23)
CALCIUM SERPL-MCNC: 9.5 MG/DL (ref 8.8–10.4)
CHLORIDE SERPL-SCNC: 106 MMOL/L (ref 98–107)
CREAT SERPL-MCNC: 0.76 MG/DL (ref 0.51–0.95)
EGFRCR SERPLBLD CKD-EPI 2021: 85 ML/MIN/1.73M2
EOSINOPHIL # BLD AUTO: 0.3 10E3/UL (ref 0–0.7)
EOSINOPHIL NFR BLD AUTO: 2 %
ERYTHROCYTE [DISTWIDTH] IN BLOOD BY AUTOMATED COUNT: 13.5 % (ref 10–15)
GLUCOSE SERPL-MCNC: 102 MG/DL (ref 70–99)
HCO3 SERPL-SCNC: 27 MMOL/L (ref 22–29)
HCT VFR BLD AUTO: 47.4 % (ref 35–47)
HGB BLD-MCNC: 16.1 G/DL (ref 11.7–15.7)
IMM GRANULOCYTES # BLD: 0 10E3/UL
IMM GRANULOCYTES NFR BLD: 0 %
LYMPHOCYTES # BLD AUTO: 3.1 10E3/UL (ref 0.8–5.3)
LYMPHOCYTES NFR BLD AUTO: 27 %
MCH RBC QN AUTO: 31.1 PG (ref 26.5–33)
MCHC RBC AUTO-ENTMCNC: 34 G/DL (ref 31.5–36.5)
MCV RBC AUTO: 92 FL (ref 78–100)
MONOCYTES # BLD AUTO: 0.8 10E3/UL (ref 0–1.3)
MONOCYTES NFR BLD AUTO: 7 %
NEUTROPHILS # BLD AUTO: 7.2 10E3/UL (ref 1.6–8.3)
NEUTROPHILS NFR BLD AUTO: 63 %
NRBC # BLD AUTO: 0 10E3/UL
NRBC BLD AUTO-RTO: 0 /100
PLATELET # BLD AUTO: 330 10E3/UL (ref 150–450)
POTASSIUM SERPL-SCNC: 4.6 MMOL/L (ref 3.4–5.3)
PROT SERPL-MCNC: 7.1 G/DL (ref 6.4–8.3)
RBC # BLD AUTO: 5.17 10E6/UL (ref 3.8–5.2)
SODIUM SERPL-SCNC: 144 MMOL/L (ref 135–145)
WBC # BLD AUTO: 11.4 10E3/UL (ref 4–11)

## 2025-07-11 PROCEDURE — 80053 COMPREHEN METABOLIC PANEL: CPT | Mod: ZL | Performed by: FAMILY MEDICINE

## 2025-07-11 PROCEDURE — G0463 HOSPITAL OUTPT CLINIC VISIT: HCPCS | Mod: 25

## 2025-07-11 PROCEDURE — 71046 X-RAY EXAM CHEST 2 VIEWS: CPT | Mod: TC

## 2025-07-11 PROCEDURE — 85004 AUTOMATED DIFF WBC COUNT: CPT | Mod: ZL | Performed by: FAMILY MEDICINE

## 2025-07-11 PROCEDURE — 36415 COLL VENOUS BLD VENIPUNCTURE: CPT | Mod: ZL | Performed by: FAMILY MEDICINE

## 2025-07-11 PROCEDURE — 93005 ELECTROCARDIOGRAM TRACING: CPT | Performed by: FAMILY MEDICINE

## 2025-07-11 PROCEDURE — 3078F DIAST BP <80 MM HG: CPT | Performed by: FAMILY MEDICINE

## 2025-07-11 PROCEDURE — 99214 OFFICE O/P EST MOD 30 MIN: CPT | Performed by: FAMILY MEDICINE

## 2025-07-11 PROCEDURE — 1126F AMNT PAIN NOTED NONE PRSNT: CPT | Performed by: FAMILY MEDICINE

## 2025-07-11 PROCEDURE — 71046 X-RAY EXAM CHEST 2 VIEWS: CPT | Mod: 26 | Performed by: RADIOLOGY

## 2025-07-11 PROCEDURE — 3074F SYST BP LT 130 MM HG: CPT | Performed by: FAMILY MEDICINE

## 2025-07-11 RX ORDER — DOXYCYCLINE HYCLATE 100 MG
100 TABLET ORAL 2 TIMES DAILY
Qty: 14 TABLET | Refills: 0 | Status: SHIPPED | OUTPATIENT
Start: 2025-07-11 | End: 2025-07-18

## 2025-07-11 RX ORDER — PREDNISONE 20 MG/1
60 TABLET ORAL DAILY
Qty: 15 TABLET | Refills: 0 | Status: SHIPPED | OUTPATIENT
Start: 2025-07-11 | End: 2025-07-16

## 2025-07-11 ASSESSMENT — ANXIETY QUESTIONNAIRES
7. FEELING AFRAID AS IF SOMETHING AWFUL MIGHT HAPPEN: NOT AT ALL
3. WORRYING TOO MUCH ABOUT DIFFERENT THINGS: NOT AT ALL
2. NOT BEING ABLE TO STOP OR CONTROL WORRYING: NOT AT ALL
GAD7 TOTAL SCORE: 0
GAD7 TOTAL SCORE: 0
8. IF YOU CHECKED OFF ANY PROBLEMS, HOW DIFFICULT HAVE THESE MADE IT FOR YOU TO DO YOUR WORK, TAKE CARE OF THINGS AT HOME, OR GET ALONG WITH OTHER PEOPLE?: NOT DIFFICULT AT ALL
7. FEELING AFRAID AS IF SOMETHING AWFUL MIGHT HAPPEN: NOT AT ALL
1. FEELING NERVOUS, ANXIOUS, OR ON EDGE: NOT AT ALL
4. TROUBLE RELAXING: NOT AT ALL
IF YOU CHECKED OFF ANY PROBLEMS ON THIS QUESTIONNAIRE, HOW DIFFICULT HAVE THESE PROBLEMS MADE IT FOR YOU TO DO YOUR WORK, TAKE CARE OF THINGS AT HOME, OR GET ALONG WITH OTHER PEOPLE: NOT DIFFICULT AT ALL
5. BEING SO RESTLESS THAT IT IS HARD TO SIT STILL: NOT AT ALL
6. BECOMING EASILY ANNOYED OR IRRITABLE: NOT AT ALL
GAD7 TOTAL SCORE: 0

## 2025-07-11 ASSESSMENT — PAIN SCALES - GENERAL: PAINLEVEL_OUTOF10: NO PAIN (0)

## 2025-07-11 NOTE — PROGRESS NOTES
Patient has been assessed for Home Oxygen needs. Oxygen readings:      SpO2 =  93% on room air at rest while awake.  *SpO2 = 87% on room air during activity/with exercise.    *SpO2 improved to 95% on 2 liters/minute during activity/with exercise.

## 2025-07-11 NOTE — PROGRESS NOTES
Assessment & Plan     Exertional chest pain  Will get stress test.  She adamantly wants to run on treadmill refusing chemical stress test.  - EKG 12-lead complete w/read - Clinics  - XR CHEST 2 VW (Clinic Performed); Future  - NM MPI Treadmill; Future  - Echocardiogram Complete; Future  - CBC with Platelets & Differential  - Comprehensive metabolic panel    FUNG (dyspnea on exertion)  O2 drops with activity, qualifies for oxygen.  Also needs stress test.  - EKG 12-lead complete w/read - Clinics  - XR CHEST 2 VW (Clinic Performed); Future  - NM MPI Treadmill; Future  - Echocardiogram Complete; Future  - CBC with Platelets & Differential  - Comprehensive metabolic panel    Positive colorectal cancer screening using Cologuard test  Advised will need cardiac eval prior to anesthesia.  - Colonoscopy Screening  Referral; Future    Pulmonary emphysema, unspecified emphysema type (H)  Drops to 87% walking, needs 2 liters NC to maintain with walking.  Also Doxy+Prednisone in case slight oncoing exacerbation.  - Home Oxygen Order for DME - ONLY FOR DME  - doxycycline hyclate (VIBRA-TABS) 100 MG tablet; Take 1 tablet (100 mg) by mouth 2 times daily for 7 days.  - predniSONE (DELTASONE) 20 MG tablet; Take 3 tablets (60 mg) by mouth daily for 5 days.          Subjective Corinne is a 69 year old, presenting for the following health issues:  Establish Care and COPD        7/11/2025     2:53 PM   Additional Questions   Roomed by Kasia         7/11/2025   Declines Weight   Did patient decline having their weight taken? Yes       New Patient/Transfer of Care     Wants referral for colonoscopy.  No previous.  Abnormal cologuard 9/2020.  States wants done in near future due to needing help with ride to/from scope and limited family availability.  No stool change or other concerns to prompt her sudden decision to have scope done.    Also wants a stress test.  She reports her history includes: COPD, overweight, smokes.  Even  walking into building heart is racing, FUNG, chest pressure.  Sounds like this is either new or worsening recently.    ER visit 2/1/21 - abnormal EKG with recommendation for outpatient stress test - never done.          Objective    /74   Pulse 92   Temp 97.5  F (36.4  C) (Tympanic)   Wt 102.1 kg (225 lb)   SpO2 93%   BMI 37.44 kg/m    Body mass index is 37.44 kg/m .  Physical Exam  Constitutional:       General: She is not in acute distress.     Appearance: Normal appearance.   Eyes:      Conjunctiva/sclera: Conjunctivae normal.      Pupils: Pupils are equal, round, and reactive to light.   Cardiovascular:      Rate and Rhythm: Normal rate and regular rhythm.      Heart sounds: Normal heart sounds. No murmur heard.  Pulmonary:      Effort: Pulmonary effort is normal.      Comments: Subtle coarseness diffusely  Abdominal:      General: Bowel sounds are normal.      Palpations: Abdomen is soft.   Musculoskeletal:      Right lower leg: No edema.      Left lower leg: No edema.   Neurological:      Mental Status: She is alert and oriented to person, place, and time.            CXR - trace effusion.  EKG - nonspecific T wave abnl    *SpO2 =  93% on room air at rest while awake.  *SpO2 = 87% on room air during activity/with exercise.  *SpO2 improved to 95% on 2 liters/minute during activity/with exercise.        Oxygen Documentation  I certify that this patient, Corinne S Eden has been under my care (or a nurse practitioner or physican's assistant working with me). This is the face-to-face encounter for oxygen medical necessity.      At the time of this encounter, I have reviewed the qualifying testing and have determined that supplemental oxygen is reasonable and necessary and is expected to improve the patient's condition in a home setting.         Patient has continued oxygen desaturation due to COPD J44.9.    If portability is ordered, is the patient mobile within the home? yes    Was this visit performed as  a telehealth visit: No               Signed Electronically by: Gerardo Ortega,

## 2025-07-13 LAB
ATRIAL RATE - MUSE: 92 BPM
DIASTOLIC BLOOD PRESSURE - MUSE: NORMAL MMHG
INTERPRETATION ECG - MUSE: NORMAL
P AXIS - MUSE: 70 DEGREES
PR INTERVAL - MUSE: 152 MS
QRS DURATION - MUSE: 88 MS
QT - MUSE: 412 MS
QTC - MUSE: 509 MS
R AXIS - MUSE: 40 DEGREES
SYSTOLIC BLOOD PRESSURE - MUSE: NORMAL MMHG
T AXIS - MUSE: 62 DEGREES
VENTRICULAR RATE- MUSE: 92 BPM

## 2025-07-14 ENCOUNTER — TELEPHONE (OUTPATIENT)
Dept: FAMILY MEDICINE | Facility: OTHER | Age: 70
End: 2025-07-14

## 2025-07-14 DIAGNOSIS — Z12.11 ENCOUNTER FOR SCREENING COLONOSCOPY: Primary | ICD-10-CM

## 2025-07-14 NOTE — LETTER
July 15, 2025      Corinne S Eden  [address] [address]        Dear Corinne,     Guide to Your Colonoscopy or Upper GI Endoscopy  Date of Procedure 8/20/25  Dr. Flowers    Pre-Admission Phone Interview  Date & Time: 8/13/25 between 8:30 am and noon    Prep Instructions for Standard MiraLAX have been included.   The medications for your prep can be picked up at your preferred pharmacy.     Please call to schedule a pre-op with your regular provider within 30 days of your procedure.       At [facility], we want to make sure that your endoscopy   is as pleasant as possible. This guide is designed to answer   questions you might have and to walk you through what   you will need to do to prepare for your procedure.  Contact us if you need to cancel your procedure or have any   additional questions.  Day Surgery Reception [phone] Open M-F, 7:00 AM-5:00 PM  After Hours [phone], Ask for House Supervisor  For Cancellations - Appointments [phone]       Sincerely,        Sandra Zamora, DEVORAH CNP    Electronically signed

## 2025-07-14 NOTE — TELEPHONE ENCOUNTER
Screening Questions for the Scheduling of Screening Colonoscopies     (If Colonoscopy is diagnostic, Provider should review the chart before scheduling.)    Are you younger than 50 or older than 80?  No    Do you take aspirin or fish oil?  yes(if yes, tell patient to stop 1 week prior to Colonoscopy)    Do you take warfarin (Coumadin), clopidogrel (Plavix), apixaban (Eliquis), dabigatram (Pradaxa), rivaroxaban (Xarelto) or any blood thinner? No    Do you use oxygen at home?  No    Do you have kidney disease? No    Are you on dialysis? No    Have you had a stroke or heart attack in the last year? No    Have you had a stent in your heart or any blood vessel in the last year? No    Have you had a transplant of any organ?  No    Have you had a colonoscopy or upper endoscopy (EGD) before?  No         Date of scheduled Colonoscopy: 8/20/25     Provider: Dr. Flowers     Pharmacy [facility] [place]

## 2025-07-15 ENCOUNTER — HOSPITAL ENCOUNTER (OUTPATIENT)
Facility: HOSPITAL | Age: 70
End: 2025-07-15
Attending: SURGERY | Admitting: SURGERY
Payer: MEDICARE

## 2025-07-15 RX ORDER — POLYETHYLENE GLYCOL 3350 17 G/17G
POWDER, FOR SOLUTION ORAL
Qty: 238 G | Refills: 0 | Status: SHIPPED | OUTPATIENT
Start: 2025-07-15

## 2025-07-15 RX ORDER — BISACODYL 5 MG
TABLET, DELAYED RELEASE (ENTERIC COATED) ORAL
Qty: 4 TABLET | Refills: 0 | Status: SHIPPED | OUTPATIENT
Start: 2025-07-15

## 2025-07-15 NOTE — TELEPHONE ENCOUNTER
Patient scheduled for screening colonoscopy on 8/20/25  with Dr. Lionel Jesus bowel preparation and script sent to [facility].   Patient does  need a pre-op. Letter to patient to schedule pre-op and message to clinic. Guide to your Colonoscopy or Upper GI Endoscopy and prep instructions sent to patient via US Mail.

## 2025-07-24 ENCOUNTER — HOSPITAL ENCOUNTER (OUTPATIENT)
Dept: CARDIOLOGY | Facility: HOSPITAL | Age: 70
Setting detail: NUCLEAR MEDICINE
End: 2025-07-24
Attending: FAMILY MEDICINE
Payer: MEDICARE

## 2025-07-24 PROCEDURE — 250N000011 HC RX IP 250 OP 636: Performed by: INTERNAL MEDICINE

## 2025-07-24 PROCEDURE — 343N000001 HC RX 343 MED OP 636: Performed by: RADIOLOGY

## 2025-07-24 PROCEDURE — A9500 TC99M SESTAMIBI: HCPCS | Performed by: RADIOLOGY

## 2025-07-24 RX ORDER — REGADENOSON 0.08 MG/ML
0.4 INJECTION, SOLUTION INTRAVENOUS ONCE
Status: COMPLETED | OUTPATIENT
Start: 2025-07-24 | End: 2025-07-24

## 2025-07-24 RX ADMIN — TECHNETIUM TC 99M SESTAMIBI 32.3 MILLICURIE: 1 INJECTION INTRAVENOUS at 09:07

## 2025-07-24 RX ADMIN — REGADENOSON 0.4 MG: 0.08 INJECTION, SOLUTION INTRAVENOUS at 09:08

## 2025-07-25 ENCOUNTER — HOSPITAL ENCOUNTER (OUTPATIENT)
Dept: CARDIOLOGY | Facility: HOSPITAL | Age: 70
Discharge: HOME | End: 2025-07-25
Attending: FAMILY MEDICINE | Admitting: INTERNAL MEDICINE
Payer: MEDICARE

## 2025-07-25 DIAGNOSIS — R07.9 EXERTIONAL CHEST PAIN: ICD-10-CM

## 2025-07-25 DIAGNOSIS — R06.09 DOE (DYSPNEA ON EXERTION): ICD-10-CM

## 2025-07-25 LAB — LVEF ECHO: NORMAL

## 2025-07-25 PROCEDURE — 93306 TTE W/DOPPLER COMPLETE: CPT

## 2025-07-25 PROCEDURE — 93306 TTE W/DOPPLER COMPLETE: CPT | Mod: 26 | Performed by: INTERNAL MEDICINE

## 2025-07-29 DIAGNOSIS — J43.9 PULMONARY EMPHYSEMA, UNSPECIFIED EMPHYSEMA TYPE (H): ICD-10-CM

## 2025-07-30 ENCOUNTER — HOSPITAL ENCOUNTER (EMERGENCY)
Facility: HOSPITAL | Age: 70
Discharge: HOME | End: 2025-07-30
Attending: NURSE PRACTITIONER
Payer: MEDICARE

## 2025-07-30 ENCOUNTER — APPOINTMENT (OUTPATIENT)
Dept: GENERAL RADIOLOGY | Facility: HOSPITAL | Age: 70
End: 2025-07-30
Attending: NURSE PRACTITIONER
Payer: MEDICARE

## 2025-07-30 VITALS
DIASTOLIC BLOOD PRESSURE: 100 MMHG | SYSTOLIC BLOOD PRESSURE: 185 MMHG | HEART RATE: 97 BPM | RESPIRATION RATE: 22 BRPM | OXYGEN SATURATION: 93 % | TEMPERATURE: 98 F

## 2025-07-30 DIAGNOSIS — R60.0 BILATERAL LEG EDEMA: ICD-10-CM

## 2025-07-30 DIAGNOSIS — S22.050A COMPRESSION FRACTURE OF T6 VERTEBRA, INITIAL ENCOUNTER (H): Primary | ICD-10-CM

## 2025-07-30 DIAGNOSIS — S22.060A COMPRESSION FRACTURE OF T7 VERTEBRA, INITIAL ENCOUNTER (H): ICD-10-CM

## 2025-07-30 PROCEDURE — 72070 X-RAY EXAM THORAC SPINE 2VWS: CPT

## 2025-07-30 PROCEDURE — G0463 HOSPITAL OUTPT CLINIC VISIT: HCPCS | Performed by: NURSE PRACTITIONER

## 2025-07-30 PROCEDURE — 72070 X-RAY EXAM THORAC SPINE 2VWS: CPT | Mod: 26 | Performed by: RADIOLOGY

## 2025-07-30 PROCEDURE — 99213 OFFICE O/P EST LOW 20 MIN: CPT | Performed by: NURSE PRACTITIONER

## 2025-07-30 RX ORDER — ALBUTEROL SULFATE 0.83 MG/ML
SOLUTION RESPIRATORY (INHALATION)
Qty: 180 ML | Refills: 0 | Status: SHIPPED | OUTPATIENT
Start: 2025-07-30

## 2025-07-30 RX ORDER — IBUPROFEN 800 MG/1
800 TABLET, FILM COATED ORAL EVERY 8 HOURS PRN
Qty: 60 TABLET | Refills: 0 | Status: SHIPPED | OUTPATIENT
Start: 2025-07-30

## 2025-07-30 ASSESSMENT — COLUMBIA-SUICIDE SEVERITY RATING SCALE - C-SSRS
1. IN THE PAST MONTH, HAVE YOU WISHED YOU WERE DEAD OR WISHED YOU COULD GO TO SLEEP AND NOT WAKE UP?: NO
2. HAVE YOU ACTUALLY HAD ANY THOUGHTS OF KILLING YOURSELF IN THE PAST MONTH?: NO
6. HAVE YOU EVER DONE ANYTHING, STARTED TO DO ANYTHING, OR PREPARED TO DO ANYTHING TO END YOUR LIFE?: NO

## 2025-07-30 ASSESSMENT — ENCOUNTER SYMPTOMS
BACK PAIN: 1
SHORTNESS OF BREATH: 0

## 2025-07-30 ASSESSMENT — ACTIVITIES OF DAILY LIVING (ADL): ADLS_ACUITY_SCORE: 41

## 2025-07-30 NOTE — ED TRIAGE NOTES
PT presents today with back pain. States her upper back, between the shoulder blades, hurts after having to lay on a hospital table for awhile with her arms above her head for a stress test. Taking tylenol and ibuprofen. Pt also has bilateral leg swelling that is new.

## 2025-07-30 NOTE — ED PROVIDER NOTES
History     Chief Complaint   Patient presents with     Back Pain     HPI  Corinne S Eden is a 69 year old female with PMH of COPD, current cigarette smoker, compression fracture of L4 and obesity, who presents to urgent care for evaluation of upper back pain.  She tells me that last week she had a stress test as well as an echocardiogram.  While she was having that she did have her arms up above her head.  Later that night she developed upper back pain.  it does not radiate anywhere.  She denies any chest pain, shortness of breath.  She does not describe the pain as ripping or tearing sensation.  She has been taking Tylenol and ibuprofen and feels it helps a little bit.  She is concerned about a compression fracture.  Reports history of compression fractures in the past. This morning, she noticed some swelling to bilateral feet. This is new for her.       Patient tells me that she only had the stress test and echocardiogram for preventative health she denies that she was having any concerning symptoms.  review of her chart shows that the notes that she was having dyspnea on exertion as well as chest pressure.  Patient denies that she has been having any chest pressure with her breathing symptoms.  History of COPD and is current smoker.  Reports decreased cigarette smoking.      Allergies:  Allergies   Allergen Reactions     Pcn [Penicillins]        Problem List:    Patient Active Problem List    Diagnosis Date Noted     COPD (chronic obstructive pulmonary disease) (H) 05/13/2024     Priority: Medium     Class 2 severe obesity due to excess calories with serious comorbidity in adult (H) 05/13/2024     Priority: Medium     Abnormal mammogram 03/18/2021     Priority: Medium     right  Patient declining follow up testing       Acute bilateral low back pain with right-sided sciatica 07/24/2020     Priority: Medium     LPRD (laryngopharyngeal reflux disease) 05/15/2018     Priority: Medium     Tobacco use 05/15/2018     ----- Message from Vitaliy Kauffman MD sent at 5/2/2023  3:34 PM EDT -----  Osteopenia.   Start Fosamax 35 mg once a week  Priority: Medium     Tobacco abuse counseling 2017     Priority: Medium     Closed compression fracture of L4 lumbar vertebra, with routine healing, subsequent encounter 2017     Priority: Medium     Closed fracture of ankle 2006     Priority: Medium     Overview:   IMO Update 10/11          Past Medical History:    History reviewed. No pertinent past medical history.    Past Surgical History:    Past Surgical History:   Procedure Laterality Date     ------------OTHER-------------      remove polyps from vocal cords      SECTION      son     FRACTURE TX, ANKLE RT/LT Right     surgery x 2     IR CONSULTATION FOR IR EXAM  2023     IR FLUORO 0-1 HOUR  2023       Family History:    Family History   Problem Relation Age of Onset     Coronary Artery Disease Mother 73     Myocardial Infarction Mother      Diabetes Brother      Lung Cancer Father 66     Diabetes Maternal Grandmother      Hypertension No family hx of      Hyperlipidemia No family hx of      Breast Cancer No family hx of      Colon Cancer No family hx of      Asthma No family hx of      Thyroid Disease No family hx of        Social History:  Marital Status:  Single [1]  Social History     Tobacco Use     Smoking status: Every Day     Current packs/day: 1.00     Average packs/day: 1 pack/day for 30.0 years (30.0 ttl pk-yrs)     Types: Cigarettes     Passive exposure: Current     Smokeless tobacco: Never   Vaping Use     Vaping status: Never Used   Substance Use Topics     Alcohol use: Yes     Comment: rarely     Drug use: No        Medications:    ibuprofen (ADVIL/MOTRIN) 800 MG tablet  albuterol (PROAIR HFA/PROVENTIL HFA/VENTOLIN HFA) 108 (90 Base) MCG/ACT inhaler  albuterol (PROVENTIL) (2.5 MG/3ML) 0.083% neb solution  alendronate (FOSAMAX) 70 MG tablet  atorvastatin (LIPITOR) 20 MG tablet  bisacodyl (DULCOLAX) 5 MG EC tablet  calcium-vitamin D (CALTRATE) 600-400 MG-UNIT per tablet  polyethylene glycol (MIRALAX) 17  GM/Dose powder  triamcinolone (KENALOG) 0.1 % external cream          Review of Systems   Respiratory:  Negative for shortness of breath.    Cardiovascular:  Negative for chest pain.   Musculoskeletal:  Positive for back pain.   All other systems reviewed and are negative.      Physical Exam   BP: (!) 185/100  Pulse: 97  Temp: 98  F (36.7  C)  Resp: 22  SpO2: 93 %      Physical Exam  Vitals and nursing note reviewed.   Constitutional:       Appearance: Normal appearance. She is not ill-appearing or toxic-appearing.      Comments: NAD.  Pleasantly talkative.   HENT:      Mouth/Throat:      Mouth: Mucous membranes are moist.   Eyes:      Pupils: Pupils are equal, round, and reactive to light.   Cardiovascular:      Rate and Rhythm: Normal rate and regular rhythm.      Heart sounds: Normal heart sounds. No murmur heard.     No friction rub.   Pulmonary:      Effort: Pulmonary effort is normal. No respiratory distress.      Breath sounds: Normal breath sounds. No rhonchi.   Musculoskeletal:         General: Normal range of motion.      Cervical back: Normal and neck supple.      Thoracic back: No swelling, edema, deformity or spasms. Normal range of motion.      Lumbar back: Normal.        Back:       Right lower leg: Edema (mild) present.      Left lower leg: Edema (mild) present.   Skin:     General: Skin is warm.      Coloration: Skin is not pale.      Findings: No bruising, erythema or lesion.   Neurological:      General: No focal deficit present.      Mental Status: She is alert and oriented to person, place, and time.       ED Course        Procedures            Recent Results (from the past 24 hours)   XR Thoracic Spine 2 Views    Narrative    PROCEDURE: XR THORACIC SPINE 2 VIEWS 7/30/2025 11:19 AM    HISTORY: midthoracic pain that started a couple of days after stress  test and echo. Concerned about Thoracic spine fracture as she has had  this before. No trauma or injury to her back    COMPARISONS:  None.    TECHNIQUE: AP and lateral    FINDINGS: There is mild thoracic scoliosis.    There is a T5 vertebral body compression fracture stable from 2023.  There is a T6 and T7 compression fracture which are new from previous  examination. The thoracic discs are normal in height. Paravertebral  soft tissues are normal.         Impression    IMPRESSION: T6 and T7 compression fractures new from May 2023    JOE HO MD         SYSTEM ID:  [id number]       Medications - No data to display    Assessments & Plan (with Medical Decision Making)  This is a 69-year-old female that presented for evaluation of upper back pain in -between her shoulder blades that started last week after she had an echo where she notes her arms were above her head for a long time. No chest pain. No murmurs auscultated today. Pain not significantly reproducible with palpation. She does have some mild edema noted to bilateral feet. No signs of infection to both feet.   Patient declined any further workup including ruling out cardiac etiology today. She was more concerned about a compression fracture. She did not appear to be in apparent distress. Xray of her thoracic spine showed new T6 and T7 compression fractures. These appear new from May 2023. She also has an old T5 fracture. Patient declined any prescriptions for pain medication. She will continue with tylenol and ibuprofen as well as using cold/warm compresses to her back. Advised her to elevate her legs and continue monitoring for any changes to the swelling. She has an appointment already scheduled with her doctor for next week so she will follow up with her. Encouraged her to return to emrgency department/ for worsening/concerning symptoms not limited to worsening back pain, chest pain, increased SOB. She verbalized understanding.        I have reviewed the nursing notes.    I have reviewed the findings, diagnosis, plan and need for follow up with the patient.  .This document was  prepared using a combination of typing and voice generated software.  While every attempt was made for accuracy, spelling and grammatical errors may exist.         Discharge Medication List as of 7/30/2025 11:43 AM        START taking these medications    Details   albuterol (PROVENTIL) (2.5 MG/3ML) 0.083% neb solution USE 1 VIAL IN NEBULIZER EVERY 6 HOURS AS NEEDED FOR SHORTNESS OF BREATH FOR WHEEZING OR  DYSPNEA, Disp-180 mL, R-0, E-Prescribe             Final diagnoses:   Compression fracture of T6 vertebra, initial encounter (H)   Compression fracture of T7 vertebra, initial encounter (H)   Bilateral leg edema       7/30/2025   [facility]       Mpofu, Ishaanudence, CNP  07/31/25 [id number]

## 2025-07-30 NOTE — DISCHARGE INSTRUCTIONS
Continue with tylenol and ibuprofen as needed for pain.    Alternate heat with cold.    Consider lidocaine patches and biofreeze.     Please return to emergency department for symptoms such as worsening back pain, SOB, worsening leg swelling, chest pain.

## 2025-08-06 ENCOUNTER — OFFICE VISIT (OUTPATIENT)
Dept: FAMILY MEDICINE | Facility: OTHER | Age: 70
End: 2025-08-06
Attending: NURSE PRACTITIONER
Payer: MEDICARE

## 2025-08-06 VITALS
WEIGHT: 225 LBS | BODY MASS INDEX: 37.49 KG/M2 | HEIGHT: 65 IN | DIASTOLIC BLOOD PRESSURE: 86 MMHG | SYSTOLIC BLOOD PRESSURE: 152 MMHG | OXYGEN SATURATION: 94 % | TEMPERATURE: 98.1 F | HEART RATE: 96 BPM

## 2025-08-06 DIAGNOSIS — R19.5 POSITIVE COLORECTAL CANCER SCREENING USING COLOGUARD TEST: ICD-10-CM

## 2025-08-06 DIAGNOSIS — S22.060D COMPRESSION FRACTURE OF T7 VERTEBRA WITH ROUTINE HEALING, SUBSEQUENT ENCOUNTER: ICD-10-CM

## 2025-08-06 DIAGNOSIS — S22.050D COMPRESSION FRACTURE OF T6 VERTEBRA WITH ROUTINE HEALING, SUBSEQUENT ENCOUNTER: Primary | ICD-10-CM

## 2025-08-06 DIAGNOSIS — Z87.891 PERSONAL HISTORY OF TOBACCO USE: ICD-10-CM

## 2025-08-06 PROCEDURE — G0463 HOSPITAL OUTPT CLINIC VISIT: HCPCS | Mod: 25

## 2025-08-06 PROCEDURE — G0296 VISIT TO DETERM LDCT ELIG: HCPCS | Performed by: NURSE PRACTITIONER

## 2025-08-06 RX ORDER — METHOCARBAMOL 500 MG/1
500-1000 TABLET, FILM COATED ORAL 4 TIMES DAILY PRN
Qty: 30 TABLET | Refills: 1 | Status: SHIPPED | OUTPATIENT
Start: 2025-08-06

## 2025-08-06 ASSESSMENT — PAIN SCALES - GENERAL: PAINLEVEL_OUTOF10: MODERATE PAIN (4)

## 2025-08-12 DIAGNOSIS — J43.9 PULMONARY EMPHYSEMA, UNSPECIFIED EMPHYSEMA TYPE (H): ICD-10-CM

## 2025-08-12 RX ORDER — ALBUTEROL SULFATE 90 UG/1
2 INHALANT RESPIRATORY (INHALATION) EVERY 6 HOURS
Qty: 18 G | Refills: 0 | Status: SHIPPED | OUTPATIENT
Start: 2025-08-12

## 2025-08-18 ENCOUNTER — TELEPHONE (OUTPATIENT)
Dept: FAMILY MEDICINE | Facility: OTHER | Age: 70
End: 2025-08-18

## 2025-08-19 ENCOUNTER — MYC MEDICAL ADVICE (OUTPATIENT)
Dept: FAMILY MEDICINE | Facility: OTHER | Age: 70
End: 2025-08-19

## 2025-08-19 DIAGNOSIS — S22.050D COMPRESSION FRACTURE OF T6 VERTEBRA WITH ROUTINE HEALING, SUBSEQUENT ENCOUNTER: ICD-10-CM

## 2025-08-19 DIAGNOSIS — S22.060D COMPRESSION FRACTURE OF T7 VERTEBRA WITH ROUTINE HEALING, SUBSEQUENT ENCOUNTER: ICD-10-CM

## 2025-08-19 DIAGNOSIS — J43.9 PULMONARY EMPHYSEMA, UNSPECIFIED EMPHYSEMA TYPE (H): ICD-10-CM

## 2025-08-19 RX ORDER — METHOCARBAMOL 500 MG/1
TABLET, FILM COATED ORAL
Qty: 30 TABLET | Refills: 0 | Status: SHIPPED | OUTPATIENT
Start: 2025-08-19

## 2025-08-19 RX ORDER — METHOCARBAMOL 500 MG/1
500-1000 TABLET, FILM COATED ORAL 4 TIMES DAILY PRN
Qty: 30 TABLET | Refills: 1 | Status: SHIPPED | OUTPATIENT
Start: 2025-08-19

## 2025-08-19 RX ORDER — ALBUTEROL SULFATE 0.83 MG/ML
SOLUTION RESPIRATORY (INHALATION)
Qty: 180 ML | Refills: 0 | Status: SHIPPED | OUTPATIENT
Start: 2025-08-19